# Patient Record
Sex: FEMALE | Race: WHITE | NOT HISPANIC OR LATINO | Employment: FULL TIME | URBAN - METROPOLITAN AREA
[De-identification: names, ages, dates, MRNs, and addresses within clinical notes are randomized per-mention and may not be internally consistent; named-entity substitution may affect disease eponyms.]

---

## 2017-01-02 ENCOUNTER — TRANSFERRED RECORDS (OUTPATIENT)
Dept: HEALTH INFORMATION MANAGEMENT | Facility: CLINIC | Age: 32
End: 2017-01-02

## 2017-03-06 DIAGNOSIS — E66.09 NON MORBID OBESITY DUE TO EXCESS CALORIES: ICD-10-CM

## 2017-03-06 RX ORDER — PHENTERMINE HYDROCHLORIDE 30 MG/1
30 CAPSULE ORAL EVERY MORNING
Qty: 30 CAPSULE | Refills: 0 | Status: SHIPPED | OUTPATIENT
Start: 2017-03-06 | End: 2017-04-04

## 2017-03-06 NOTE — TELEPHONE ENCOUNTER
Phentermine      Last Written Prescription Date:  1/5/16  Last Fill Quantity: 30,   # refills: 0  Last Office Visit with [facility], [facility] or  Health prescribing provider: 12/23/16  Future Office visit:       Routing refill request to provider for review/approval because:  Drug not on the [facility], [facility] or  Health refill protocol or controlled substance

## 2017-03-13 ENCOUNTER — OFFICE VISIT (OUTPATIENT)
Dept: INTERNAL MEDICINE | Facility: CLINIC | Age: 32
End: 2017-03-13
Payer: COMMERCIAL

## 2017-03-13 VITALS
WEIGHT: 138 LBS | DIASTOLIC BLOOD PRESSURE: 80 MMHG | TEMPERATURE: 97.8 F | SYSTOLIC BLOOD PRESSURE: 132 MMHG | BODY MASS INDEX: 25.86 KG/M2

## 2017-03-13 DIAGNOSIS — J01.90 ACUTE SINUSITIS WITH SYMPTOMS > 10 DAYS: Primary | ICD-10-CM

## 2017-03-13 PROCEDURE — 99213 OFFICE O/P EST LOW 20 MIN: CPT | Performed by: FAMILY MEDICINE

## 2017-03-13 RX ORDER — AMOXICILLIN AND CLAVULANATE POTASSIUM 500; 125 MG/1; MG/1
1 TABLET, FILM COATED ORAL 3 TIMES DAILY
Qty: 30 TABLET | Refills: 1 | Status: SHIPPED | OUTPATIENT
Start: 2017-03-13 | End: 2017-08-01

## 2017-03-13 NOTE — PROGRESS NOTES
SUBJECTIVE:                                                    Dinorah Baldwin is a 31 year old female who presents to clinic today for the following health issues:    SUBJECTIVE:  Dinorah Baldwin is a 31 year old female here with concerns about sinus infection.  She states onset of symptoms were 2 week(s) ago.  She has had maxillary pressure. Course of illness is same. Severity moderate  Current and Associated symptoms: nasal congestion and cough   Predisposing factors include HX of recurrent sinusitis. Recent treatment has included: Decongestants    Past Medical History   Diagnosis Date     ALLERGIC RHINITIS      Closed fracture of unspecified part of humerus 1997     Left     DEPRESSIVE DISORDER      Tobacco use disorder      Unspecified closed fracture of ankle 1993     Ankle fracture     Social History   Substance Use Topics     Smoking status: Former Smoker     Years: 1.00     Quit date: 10/22/2011     Smokeless tobacco: Never Used      Comment: 1 pack every 2.5 days     Alcohol use Yes      Comment: once every two weeks       ROS:  CONSTITUTIONAL:NEGATIVE for fever, chills, change in weight  INTEGUMENTARY/SKIN: NEGATIVE for worrisome rashes, moles or lesions    OBJECTIVE:  /80  Temp 97.8  F (36.6  C) (Oral)  Wt 138 lb (62.6 kg)  BMI 25.86 kg/m2  Exam:GENERAL APPEARANCE: healthy, alert and no distress  EYES: EOMI,  PERRL, conjunctiva clear  HENT: ear canals and TM's normal.  Nose and mouth without ulcers, erythema or lesions  NECK: supple, nontender, no lymphadenopathy  RESP: lungs clear to auscultation - no rales, rhonchi or wheezes  CV: regular rates and rhythm, normal S1 S2, no murmur noted  NEURO: Normal strength and tone, sensory exam grossly normal,  normal speech and mentation  SKIN: no suspicious lesions or rashes    ASSESSMENT:  Sinusitis    PLAN:  1. Acute sinusitis with symptoms > 10 days  Symptomatic cares were discussed in detail.   Pt instructed to come back to the clinic for  worsening sx    - amoxicillin-clavulanate (AUGMENTIN) 500-125 MG per tablet; Take 1 tablet by mouth 3 times daily  Dispense: 30 tablet; Refill: 1

## 2017-03-13 NOTE — NURSING NOTE
"Chief Complaint   Patient presents with     URI     Nasal congestion, runny nose, ears were ringing, facial pressure- sx started on Friday        Initial /80  Temp 97.8  F (36.6  C) (Oral)  Wt 138 lb (62.6 kg)  BMI 25.86 kg/m2 Estimated body mass index is 25.86 kg/(m^2) as calculated from the following:    Height as of 10/14/16: 5' 1.25\" (1.556 m).    Weight as of this encounter: 138 lb (62.6 kg).  Medication Reconciliation: complete      Pennie Mitchell CMA      "

## 2017-03-13 NOTE — MR AVS SNAPSHOT
"              After Visit Summary   3/13/2017    Dinorah Baldwin    MRN: [id number]           Patient Information     Date Of Birth          1985        Visit Information        Provider Department      3/13/2017 3:20 PM Mainor Ochoa MD [facility]        Today's Diagnoses     Acute sinusitis with symptoms > 10 days    -  1       Follow-ups after your visit        Your next 10 appointments already scheduled     Mar 13, 2017  3:20 PM CDT   SHORT with Mainor Ochoa MD   [facility] ([facility]    [address]  [facility] [address]   [phone]              Who to contact     If you have questions or need follow up information about today's clinic visit or your schedule please contact [facility] directly at [phone].  Normal or non-critical lab and imaging results will be communicated to you by MyChart, letter or phone within 4 business days after the clinic has received the results. If you do not hear from us within 7 days, please contact the clinic through MyChart or phone. If you have a critical or abnormal lab result, we will notify you by phone as soon as possible.  Submit refill requests through [employer] or call your pharmacy and they will forward the refill request to us. Please allow 3 business days for your refill to be completed.          Additional Information About Your Visit        MyChart Information     [employer] lets you send messages to your doctor, view your test results, renew your prescriptions, schedule appointments and more. To sign up, go to [email or web address] . Click on \"Log in\" on the left side of the screen, which will take you to the Welcome page. Then click on \"Sign up Now\" on the right side of the page.     You will be asked to enter the access code listed below, as well as some personal information. Please follow the directions to create your username and password.     Your access " code is: [email or web address]  Expires: 2017  9:30 AM     Your access code will  in 90 days. If you need help or a new code, please call your [facility] or [phone].        Care EveryWhere ID     This is your Care EveryWhere ID. This could be used by other organizations to access your [facility] medical records  [id number]        Your Vitals Were     Temperature BMI (Body Mass Index)                97.8  F (36.6  C) (Oral) 25.86 kg/m2           Blood Pressure from Last 3 Encounters:   17 132/80   16 117/67   16 122/86    Weight from Last 3 Encounters:   17 138 lb (62.6 kg)   16 143 lb 11.8 oz (65.2 kg)   16 144 lb (65.3 kg)              Today, you had the following     No orders found for display         Today's Medication Changes          These changes are accurate as of: 3/13/17  9:30 AM.  If you have any questions, ask your nurse or doctor.               Start taking these medicines.        Dose/Directions    amoxicillin-clavulanate 500-125 MG per tablet   Commonly known as:  AUGMENTIN   Used for:  Acute sinusitis with symptoms > 10 days   Started by:  Mainor Ochoa MD        Dose:  1 tablet   Take 1 tablet by mouth 3 times daily   Quantity:  30 tablet   Refills:  1            Where to get your medicines      These medications were sent to [facility] [place], [place] - [facility] [address]  [facility] [address] [facility] [address]     Phone:  [phone]     amoxicillin-clavulanate 500-125 MG per tablet                Primary Care Provider Office Phone # Fax #    Moraima Kurtz [phone]       [facility] [address]  [place] [facility] [address]        Thank you!     Thank you for choosing [facility]  for your care. Our goal is always to provide you with excellent care. Hearing back from our patients is one way we can continue to improve our services. Please take a few minutes to complete  the written survey that you may receive in the mail after your visit with us. Thank you!             Your Updated Medication List - Protect others around you: Learn how to safely use, store and throw away your medicines at www.disposemymeds.org.          This list is accurate as of: 3/13/17  9:30 AM.  Always use your most recent med list.                   Brand Name Dispense Instructions for use    amoxicillin-clavulanate 500-125 MG per tablet    AUGMENTIN    30 tablet    Take 1 tablet by mouth 3 times daily       LORazepam 0.5 MG tablet    ATIVAN     Take 0.5 mg by mouth as needed for anxiety       MULTI ADULT GUMMIES PO      Take 1 chew tab by mouth three times a week       NEXPLANON SC      by IMPLANT route continuous       oxyCODONE 5 MG IR tablet    ROXICODONE    60 tablet    Take 1-2 tablets (5-10 mg) by mouth every 4 hours as needed for pain maximum 10 tablet(s) per day       phentermine 30 MG capsule     30 capsule    Take 1 capsule (30 mg) by mouth every morning

## 2017-03-14 ASSESSMENT — PATIENT HEALTH QUESTIONNAIRE - PHQ9: SUM OF ALL RESPONSES TO PHQ QUESTIONS 1-9: 2

## 2017-04-04 DIAGNOSIS — E66.09 NON MORBID OBESITY DUE TO EXCESS CALORIES: ICD-10-CM

## 2017-04-04 RX ORDER — PHENTERMINE HYDROCHLORIDE 30 MG/1
30 CAPSULE ORAL EVERY MORNING
Qty: 30 CAPSULE | Refills: 1 | Status: SHIPPED | OUTPATIENT
Start: 2017-04-04 | End: 2017-07-17

## 2017-07-17 DIAGNOSIS — E66.09 NON MORBID OBESITY DUE TO EXCESS CALORIES: ICD-10-CM

## 2017-07-17 RX ORDER — PHENTERMINE HYDROCHLORIDE 30 MG/1
30 CAPSULE ORAL EVERY MORNING
Qty: 30 CAPSULE | Refills: 1 | Status: SHIPPED | OUTPATIENT
Start: 2017-07-17 | End: 2017-11-14

## 2017-08-01 ENCOUNTER — OFFICE VISIT (OUTPATIENT)
Dept: INTERNAL MEDICINE | Facility: CLINIC | Age: 32
End: 2017-08-01
Payer: COMMERCIAL

## 2017-08-01 VITALS
TEMPERATURE: 98.1 F | SYSTOLIC BLOOD PRESSURE: 120 MMHG | HEIGHT: 61 IN | HEART RATE: 117 BPM | OXYGEN SATURATION: 98 % | WEIGHT: 143 LBS | BODY MASS INDEX: 27 KG/M2 | DIASTOLIC BLOOD PRESSURE: 72 MMHG

## 2017-08-01 DIAGNOSIS — L91.0 KELOID SCAR: ICD-10-CM

## 2017-08-01 DIAGNOSIS — Z30.09 BIRTH CONTROL COUNSELING: ICD-10-CM

## 2017-08-01 DIAGNOSIS — N92.1 MENOMETRORRHAGIA: ICD-10-CM

## 2017-08-01 DIAGNOSIS — Z00.00 ENCOUNTER FOR ROUTINE ADULT HEALTH EXAMINATION WITHOUT ABNORMAL FINDINGS: Primary | ICD-10-CM

## 2017-08-01 LAB
ALBUMIN SERPL-MCNC: 3.8 G/DL (ref 3.4–5)
ALP SERPL-CCNC: 47 U/L (ref 40–150)
ALT SERPL W P-5'-P-CCNC: 50 U/L (ref 0–50)
ANION GAP SERPL CALCULATED.3IONS-SCNC: 9 MMOL/L (ref 3–14)
AST SERPL W P-5'-P-CCNC: 97 U/L (ref 0–45)
BASOPHILS # BLD AUTO: 0 10E9/L (ref 0–0.2)
BASOPHILS NFR BLD AUTO: 0.2 %
BILIRUB SERPL-MCNC: 0.4 MG/DL (ref 0.2–1.3)
BUN SERPL-MCNC: 10 MG/DL (ref 7–30)
CALCIUM SERPL-MCNC: 9.3 MG/DL (ref 8.5–10.1)
CHLORIDE SERPL-SCNC: 106 MMOL/L (ref 94–109)
CHOLEST SERPL-MCNC: 176 MG/DL
CO2 SERPL-SCNC: 25 MMOL/L (ref 20–32)
CREAT SERPL-MCNC: 0.78 MG/DL (ref 0.52–1.04)
DIFFERENTIAL METHOD BLD: ABNORMAL
EOSINOPHIL # BLD AUTO: 0.1 10E9/L (ref 0–0.7)
EOSINOPHIL NFR BLD AUTO: 0.8 %
ERYTHROCYTE [DISTWIDTH] IN BLOOD BY AUTOMATED COUNT: 12.9 % (ref 10–15)
GFR SERPL CREATININE-BSD FRML MDRD: 87 ML/MIN/1.7M2
GLUCOSE SERPL-MCNC: 93 MG/DL (ref 70–99)
HCT VFR BLD AUTO: 40.1 % (ref 35–47)
HDLC SERPL-MCNC: 61 MG/DL
HGB BLD-MCNC: 13.3 G/DL (ref 11.7–15.7)
LDLC SERPL CALC-MCNC: 95 MG/DL
LYMPHOCYTES # BLD AUTO: 2.6 10E9/L (ref 0.8–5.3)
LYMPHOCYTES NFR BLD AUTO: 19.3 %
MCH RBC QN AUTO: 30.2 PG (ref 26.5–33)
MCHC RBC AUTO-ENTMCNC: 33.2 G/DL (ref 31.5–36.5)
MCV RBC AUTO: 91 FL (ref 78–100)
MONOCYTES # BLD AUTO: 0.6 10E9/L (ref 0–1.3)
MONOCYTES NFR BLD AUTO: 4.3 %
NEUTROPHILS # BLD AUTO: 10.1 10E9/L (ref 1.6–8.3)
NEUTROPHILS NFR BLD AUTO: 75.4 %
NONHDLC SERPL-MCNC: 115 MG/DL
PLATELET # BLD AUTO: 243 10E9/L (ref 150–450)
POTASSIUM SERPL-SCNC: 4 MMOL/L (ref 3.4–5.3)
PROT SERPL-MCNC: 7.4 G/DL (ref 6.8–8.8)
RBC # BLD AUTO: 4.4 10E12/L (ref 3.8–5.2)
SODIUM SERPL-SCNC: 140 MMOL/L (ref 133–144)
TRIGL SERPL-MCNC: 98 MG/DL
TSH SERPL DL<=0.005 MIU/L-ACNC: 1.13 MU/L (ref 0.4–4)
WBC # BLD AUTO: 13.4 10E9/L (ref 4–11)

## 2017-08-01 PROCEDURE — 80061 LIPID PANEL: CPT | Performed by: INTERNAL MEDICINE

## 2017-08-01 PROCEDURE — 36415 COLL VENOUS BLD VENIPUNCTURE: CPT | Performed by: INTERNAL MEDICINE

## 2017-08-01 PROCEDURE — 99395 PREV VISIT EST AGE 18-39: CPT | Performed by: INTERNAL MEDICINE

## 2017-08-01 PROCEDURE — 80050 GENERAL HEALTH PANEL: CPT | Performed by: INTERNAL MEDICINE

## 2017-08-01 ASSESSMENT — ANXIETY QUESTIONNAIRES
3. WORRYING TOO MUCH ABOUT DIFFERENT THINGS: NOT AT ALL
5. BEING SO RESTLESS THAT IT IS HARD TO SIT STILL: NOT AT ALL
2. NOT BEING ABLE TO STOP OR CONTROL WORRYING: NOT AT ALL
6. BECOMING EASILY ANNOYED OR IRRITABLE: NOT AT ALL
GAD7 TOTAL SCORE: 0
1. FEELING NERVOUS, ANXIOUS, OR ON EDGE: NOT AT ALL
7. FEELING AFRAID AS IF SOMETHING AWFUL MIGHT HAPPEN: NOT AT ALL

## 2017-08-01 ASSESSMENT — PATIENT HEALTH QUESTIONNAIRE - PHQ9: 5. POOR APPETITE OR OVEREATING: NOT AT ALL

## 2017-08-01 NOTE — PROGRESS NOTES
SUBJECTIVE:   CC: Dinorah Baldwin is an 31 year old woman who presents for preventive health visit.     Physical   Annual:     Getting at least 3 servings of Calcium per day::  Yes    Bi-annual eye exam::  NO (AT [facility])    Dental care twice a year::  Yes    Sleep apnea or symptoms of sleep apnea::  None    Diet::  Regular (no restrictions)    Frequency of exercise::  4-5 days/week    Duration of exercise::  45-60 minutes    Taking medications regularly::  Yes    Medication side effects::  None    Additional concerns today::  No      [employer] with group exercises    Today's PHQ-2 Score: PHQ-2 ( 1999 Pfizer) 3/13/2017   Q1: Little interest or pleasure in doing things 0   Q2: Feeling down, depressed or hopeless 0   PHQ-2 Score 0       Abuse: Current or Past(Physical, Sexual or Emotional)- No  Do you feel safe in your environment - Yes    Social History   Substance Use Topics     Smoking status: Former Smoker     Years: 1.00     Quit date: 10/22/2011     Smokeless tobacco: Never Used      Comment: 1 pack every 2.5 days     Alcohol use Yes      Comment: once every two weeks     The patient does not drink >3 drinks per day nor >7 drinks per week.    Reviewed orders with patient.  Reviewed health maintenance and updated orders accordingly - Yes      Mammogram not appropriate for this patient based on age.    Pertinent mammograms are reviewed under the imaging tab.  History of abnormal Pap smear: NO - age 30- 65 PAP every 3 years recommended    Reviewed and updated as needed this visit by clinical staff  Allergies  Meds         Reviewed and updated as needed this visit by Provider      ROS:  C: NEGATIVE for fever, chills, change in weight  I: NEGATIVE for worrisome rashes, moles or lesions  E: NEGATIVE for vision changes or irritation  ENT: NEGATIVE for ear, mouth and throat problems  R: NEGATIVE for significant cough or SOB  B: NEGATIVE for masses, tenderness or discharge  CV: NEGATIVE for chest pain, palpitations or  "peripheral edema  GI: NEGATIVE for nausea, abdominal pain, heartburn, or change in bowel habits  : NEGATIVE for unusual urinary or vaginal symptoms. Periods are regular.  M: NEGATIVE for significant arthralgias or myalgia  N: NEGATIVE for weakness, dizziness or paresthesias  P: NEGATIVE for changes in mood or affect     OBJECTIVE:   There were no vitals taken for this visit.   /72 (BP Location: Left arm, Cuff Size: Adult Large)  Pulse 117  Temp 98.1  F (36.7  C) (Oral)  Ht 5' 1.25\" (1.556 m)  Wt 143 lb (64.9 kg)  LMP 07/29/2017  SpO2 98%  Breastfeeding? No  BMI 26.8 kg/m2    EXAM:  GENERAL: healthy, alert and no distress  EYES: Eyes grossly normal to inspection, PERRL and conjunctivae and sclerae normal  HENT: ear canals and TM's normal, nose and mouth without ulcers or lesions  NECK: no adenopathy, no asymmetry, masses, or scars and thyroid normal to palpation  RESP: lungs clear to auscultation - no rales, rhonchi or wheezes  BREAST: normal without masses, tenderness or nipple discharge and no palpable axillary masses or adenopathy  CV: regular rate and rhythm, normal S1 S2, no S3 or S4, no murmur, click or rub, no peripheral edema and peripheral pulses strong  ABDOMEN: soft, nontender, no hepatosplenomegaly, no masses and bowel sounds normal  MS: no gross musculoskeletal defects noted, no edema  SKIN: no suspicious lesions or rashes; keloid scarring on chest  NEURO: Normal strength and tone, mentation intact and speech normal  PSYCH: mentation appears normal, affect normal/bright    ASSESSMENT/PLAN:       ICD-10-CM    1. Encounter for routine adult health examination without abnormal findings Z00.00        COUNSELING:  Reviewed preventive health counseling, as reflected in patient instructions       reports that she quit smoking about 5 years ago. She quit after 1.00 years of use. She has never used smokeless tobacco.    Estimated body mass index is 25.86 kg/(m^2) as calculated from the " "following:    Height as of 10/14/16: 5' 1.25\" (1.556 m).    Weight as of 3/13/17: 138 lb (62.6 kg).         Counseling Resources:  ATP IV Guidelines  Pooled Cohorts Equation Calculator  Breast Cancer Risk Calculator  FRAX Risk Assessment  ICSI Preventive Guidelines  Dietary Guidelines for Americans, 2010  USDA's MyPlate  ASA Prophylaxis  Lung CA Screening    Moraima Kurtz MD  [facility]    Please abstract the following data from this visit with this patient into the appropriate field in Epic:    Pap smear done on this date: 3/1/2016 (approximately), by this group: [facility], results were wnl.     "

## 2017-08-01 NOTE — NURSING NOTE
"Chief Complaint   Patient presents with     Physical     fasting, discuss meds       Initial /72 (BP Location: Left arm, Cuff Size: Adult Large)  Pulse 117  Temp 98.1  F (36.7  C) (Oral)  Ht 5' 1.25\" (1.556 m)  Wt 143 lb (64.9 kg)  LMP 07/29/2017  SpO2 98%  Breastfeeding? No  BMI 26.8 kg/m2 Estimated body mass index is 26.8 kg/(m^2) as calculated from the following:    Height as of this encounter: 5' 1.25\" (1.556 m).    Weight as of this encounter: 143 lb (64.9 kg).  Medication Reconciliation: complete   Nena Champion CMA      "

## 2017-08-01 NOTE — MR AVS SNAPSHOT
After Visit Summary   8/1/2017    Dinorah Baldwin    MRN: [id number]           Patient Information     Date Of Birth          1985        Visit Information        Provider Department      8/1/2017 8:40 AM Moraima Kurtz MD [facility]        Today's Diagnoses     Encounter for routine adult health examination without abnormal findings    -  1    Birth control counseling           Follow-ups after your visit        Additional Services     OB/GYN REFERRAL       Your provider has referred you to:  [facility]: [facility] [phone]   [email or web address]      Please be aware that coverage of these services is subject to the terms and limitations of your health insurance plan.  Call member services at your health plan with any benefit or coverage questions.      Please bring the following with you to your appointment:    (1) Any X-Rays, CTs or MRIs which have been performed.  Contact the facility where they were done to arrange for  prior to your scheduled appointment.   (2) List of current medications   (3) This referral request   (4) Any documents/labs given to you for this referral                  Your next 10 appointments already scheduled     Aug 01, 2017  8:40 AM CDT   PHYSICAL with Moraima Kurtz MD   [facility] ([facility])    [address] [place], [place] [address] [address] [phone]              Who to contact     If you have questions or need follow up information about today's clinic visit or your schedule please contact [facility] directly at [phone].  Normal or non-critical lab and imaging results will be communicated to you by MyChart, letter or phone within 4 business days after the clinic has received the results. If you do not hear from us within 7 days, please contact the clinic through MyChart or phone. If you have a critical or abnormal  "lab result, we will notify you by phone as soon as possible.  Submit refill requests through [facility] or call your pharmacy and they will forward the refill request to us. Please allow 3 business days for your refill to be completed.          Additional Information About Your Visit        Linqu[facility] Information     [employer] lets you send messages to your doctor, view your test results, renew your prescriptions, schedule appointments and more. To sign up, go to [email or web address] . Click on \"Log in\" on the left side of the screen, which will take you to the Welcome page. Then click on \"Sign up Now\" on the right side of the page.     You will be asked to enter the access code listed below, as well as some personal information. Please follow the directions to create your username and password.     Your access code is: [email or web address]  Expires: 10/30/2017  8:28 AM     Your access code will  in 90 days. If you need help or a new code, please call your [facility] clinic or [phone].        Care EveryWhere ID     This is your Care EveryWhere ID. This could be used by other organizations to access your [facility] medical records  [id number]        Your Vitals Were     Pulse Temperature Height Last Period Pulse Oximetry Breastfeeding?    117 98.1  F (36.7  C) (Oral) 5' 1.25\" (1.556 m) 2017 98% No    BMI (Body Mass Index)                   26.8 kg/m2            Blood Pressure from Last 3 Encounters:   17 120/72   17 132/80   16 117/67    Weight from Last 3 Encounters:   17 143 lb (64.9 kg)   17 138 lb (62.6 kg)   16 143 lb 11.8 oz (65.2 kg)              We Performed the Following     CBC with platelets differential     Comprehensive metabolic panel     Lipid panel reflex to direct LDL     OB/GYN REFERRAL     TSH with free T4 reflex        Primary Care Provider Office Phone # Fax #    Moraima Kurtz [phone]       [facility] [address] " [facility] [address]        Equal Access to Services     IFEOMA HOFF : Hadii sony whyte peteropal Radha, wabryonda luqadaha, qaybta kahardeepsiva longo, ginette bernalmauryjessica motta. So [facility] [phone].    ATENCIÓN: Si habla español, tiene a cantrell disposición servicios gratuitos de asistencia lingüística. Llame al [phone].    We comply with applicable federal civil rights laws and [place] laws. We do not discriminate on the basis of race, color, national origin, age, disability sex, sexual orientation or gender identity.            Thank you!     Thank you for choosing [facility]  for your care. Our goal is always to provide you with excellent care. Hearing back from our patients is one way we can continue to improve our services. Please take a few minutes to complete the written survey that you may receive in the mail after your visit with us. Thank you!             Your Updated Medication List - Protect others around you: Learn how to safely use, store and throw away your medicines at www.disposemymeds.org.          This list is accurate as of: 8/1/17  8:33 AM.  Always use your most recent med list.                   Brand Name Dispense Instructions for use Diagnosis    LORazepam 0.5 MG tablet    ATIVAN     Take 0.5 mg by mouth as needed for anxiety        MULTI ADULT GUMMIES PO      Take 1 chew tab by mouth three times a week        NEXPLANON SC      by IMPLANT route continuous        phentermine 30 MG capsule     30 capsule    Take 1 capsule (30 mg) by mouth every morning    Non morbid obesity due to excess calories

## 2017-08-01 NOTE — TELEPHONE ENCOUNTER
Pt requesting refill of previous OCP. Pt currently has the Nexplanon but is requesting OCP to manage periods as they are lasting about 2 weeks.

## 2017-08-01 NOTE — LETTER
[facility]  [address], [address]  [place] [address]  [phone]        August 3, 2017    Dinorah Baldwin  [address] [facility] [address]            Dear Ms. Dinorah Baldwin:      Enclosed are the results of the recent labs.     The labs are all within normal limits except the liver test is slightly elevated and the white blood cell count.   Recommend repeating these labs in 2 weeks at a LAB ONLY appointment.     Please call the clinic at [phone] if you have any questions.      Sincerely,        Moraima Kurtz M.D.

## 2017-08-02 ASSESSMENT — ANXIETY QUESTIONNAIRES: GAD7 TOTAL SCORE: 0

## 2017-08-09 ENCOUNTER — TELEPHONE (OUTPATIENT)
Dept: INTERNAL MEDICINE | Facility: CLINIC | Age: 32
End: 2017-08-09

## 2017-08-09 DIAGNOSIS — J01.01 ACUTE RECURRENT MAXILLARY SINUSITIS: Primary | ICD-10-CM

## 2017-08-09 RX ORDER — AZITHROMYCIN 250 MG/1
TABLET, FILM COATED ORAL
Qty: 6 TABLET | Refills: 0 | Status: SHIPPED | OUTPATIENT
Start: 2017-08-09 | End: 2017-12-04

## 2017-08-09 NOTE — TELEPHONE ENCOUNTER
Reason for Call:  Medication or medication refill:    Do you use a [facility]?  Name of the pharmacy and phone number for the current request:  [facility] [address] ([place]) - [phone]    Name of the medication requested: amoxicillen    Other request: none    Can we leave a detailed message on this number? YES    Phone number patient can be reached at: Cell number on file:    Telephone Information:   Mobile [phone]       Best Time: any     Call taken on 8/9/2017 at 9:15 AM by Michela Spivey

## 2017-08-09 NOTE — TELEPHONE ENCOUNTER
Called pt-Stated she is sure has a sinus infection. Symptoms started last week-Facial pressure, green nasal d/c. Pt hoping [facility] can do rx for Amox

## 2017-08-15 ENCOUNTER — TELEPHONE (OUTPATIENT)
Dept: INTERNAL MEDICINE | Facility: CLINIC | Age: 32
End: 2017-08-15

## 2017-08-15 DIAGNOSIS — J01.00 ACUTE MAXILLARY SINUSITIS, RECURRENCE NOT SPECIFIED: Primary | ICD-10-CM

## 2017-08-15 DIAGNOSIS — R74.01 ELEVATED AST (SGOT): ICD-10-CM

## 2017-08-15 DIAGNOSIS — D72.828 OTHER ELEVATED WHITE BLOOD CELL (WBC) COUNT: ICD-10-CM

## 2017-08-15 LAB
AST SERPL W P-5'-P-CCNC: 13 U/L (ref 0–45)
BASOPHILS # BLD AUTO: 0 10E9/L (ref 0–0.2)
BASOPHILS NFR BLD AUTO: 0.2 %
DIFFERENTIAL METHOD BLD: ABNORMAL
EOSINOPHIL # BLD AUTO: 0.1 10E9/L (ref 0–0.7)
EOSINOPHIL NFR BLD AUTO: 0.5 %
ERYTHROCYTE [DISTWIDTH] IN BLOOD BY AUTOMATED COUNT: 13.1 % (ref 10–15)
HCT VFR BLD AUTO: 40 % (ref 35–47)
HGB BLD-MCNC: 13.2 G/DL (ref 11.7–15.7)
LYMPHOCYTES # BLD AUTO: 3.1 10E9/L (ref 0.8–5.3)
LYMPHOCYTES NFR BLD AUTO: 27.1 %
MCH RBC QN AUTO: 29.7 PG (ref 26.5–33)
MCHC RBC AUTO-ENTMCNC: 33 G/DL (ref 31.5–36.5)
MCV RBC AUTO: 90 FL (ref 78–100)
MONOCYTES # BLD AUTO: 0.7 10E9/L (ref 0–1.3)
MONOCYTES NFR BLD AUTO: 6.4 %
NEUTROPHILS # BLD AUTO: 7.5 10E9/L (ref 1.6–8.3)
NEUTROPHILS NFR BLD AUTO: 65.8 %
PLATELET # BLD AUTO: 267 10E9/L (ref 150–450)
RBC # BLD AUTO: 4.44 10E12/L (ref 3.8–5.2)
WBC # BLD AUTO: 11.4 10E9/L (ref 4–11)

## 2017-08-15 PROCEDURE — 36415 COLL VENOUS BLD VENIPUNCTURE: CPT | Performed by: INTERNAL MEDICINE

## 2017-08-15 PROCEDURE — 84450 TRANSFERASE (AST) (SGOT): CPT | Performed by: INTERNAL MEDICINE

## 2017-08-15 PROCEDURE — 85025 COMPLETE CBC W/AUTO DIFF WBC: CPT | Performed by: INTERNAL MEDICINE

## 2017-08-15 NOTE — TELEPHONE ENCOUNTER
Could give the medication 5 more days (zpak lasts in system for 10 days) or could try augmentin if symptoms keep worsening    I sent augmentin to pharmacy in case still no improvement in a couple of days    Patient will need to let pharmacy know to fill script if she decides to take med

## 2017-08-15 NOTE — TELEPHONE ENCOUNTER
Patient finished her RX for Zithromax yesterday 8/14. Patient states symptoms are still ongoing and would like to know what PCP recommends next for treatment.     Symptoms still include: Green Mucus, Congestion, Pressure on left side of face.

## 2017-08-15 NOTE — TELEPHONE ENCOUNTER
Pt advised to allow medication 5 more days to work, but if symptoms get worse over the next few days she can take Augmentin. Prescription was sent for Augmentin and pt to let the pharmacy know if she needs to fill this. Pt verbalizes understanding and agrees with plan.

## 2017-11-14 ENCOUNTER — TELEPHONE (OUTPATIENT)
Dept: OBGYN | Facility: CLINIC | Age: 32
End: 2017-11-14

## 2017-11-14 ENCOUNTER — TELEPHONE (OUTPATIENT)
Dept: INTERNAL MEDICINE | Facility: CLINIC | Age: 32
End: 2017-11-14

## 2017-11-14 DIAGNOSIS — E66.09 NON MORBID OBESITY DUE TO EXCESS CALORIES: ICD-10-CM

## 2017-11-14 DIAGNOSIS — Z11.3 SCREEN FOR STD (SEXUALLY TRANSMITTED DISEASE): ICD-10-CM

## 2017-11-14 DIAGNOSIS — Z11.3 SCREEN FOR STD (SEXUALLY TRANSMITTED DISEASE): Primary | ICD-10-CM

## 2017-11-14 PROCEDURE — 36415 COLL VENOUS BLD VENIPUNCTURE: CPT | Performed by: FAMILY MEDICINE

## 2017-11-14 PROCEDURE — 87389 HIV-1 AG W/HIV-1&-2 AB AG IA: CPT | Performed by: FAMILY MEDICINE

## 2017-11-14 RX ORDER — PHENTERMINE HYDROCHLORIDE 30 MG/1
30 CAPSULE ORAL EVERY MORNING
Qty: 30 CAPSULE | Refills: 1 | Status: SHIPPED | OUTPATIENT
Start: 2017-11-14 | End: 2018-05-14

## 2017-11-14 NOTE — TELEPHONE ENCOUNTER
Rx hand carried to [facility].  Attempted to contact patient, no answer, left detailed voice message as instructed by patient with provider message from below and informed to call back with questions.

## 2017-11-14 NOTE — TELEPHONE ENCOUNTER
Reason for Call:  Medication or medication refill:    Do you use a [facility]?  Name of the pharmacy and phone number for the current request:  [facility] [address] ([place]) - [phone]    Name of the medication requested: Phentermine     Other request: Pt states weight gain has occurred, almost back up to 150 lbs. Wondering if she can restart the rx (phentermine) for a few months, has made upcoming appointment to also discuss weight fluctuations/fatigue which she believes could be cause.    Can we leave a detailed message on this number? YES    Phone number patient can be reached at: Home number on file [phone] (home)    Best Time: any    Call taken on 11/14/2017 at 9:01 AM by Lina Wilkins

## 2017-11-14 NOTE — TELEPHONE ENCOUNTER
Pt requesting HIV testing.   Order placed.   Pt will have a lab only appt.   I also told the pt that she should have a f/u test in  And 6 months.    CAROLEE Lynch RN

## 2017-11-15 LAB — HIV 1+2 AB+HIV1 P24 AG SERPL QL IA: NONREACTIVE

## 2017-12-04 ENCOUNTER — OFFICE VISIT (OUTPATIENT)
Dept: INTERNAL MEDICINE | Facility: CLINIC | Age: 32
End: 2017-12-04
Payer: COMMERCIAL

## 2017-12-04 VITALS
DIASTOLIC BLOOD PRESSURE: 80 MMHG | SYSTOLIC BLOOD PRESSURE: 120 MMHG | BODY MASS INDEX: 27.94 KG/M2 | HEART RATE: 110 BPM | OXYGEN SATURATION: 99 % | TEMPERATURE: 97.9 F | HEIGHT: 61 IN | WEIGHT: 148 LBS

## 2017-12-04 DIAGNOSIS — R53.83 FATIGUE, UNSPECIFIED TYPE: ICD-10-CM

## 2017-12-04 DIAGNOSIS — F32.1 MODERATE SINGLE CURRENT EPISODE OF MAJOR DEPRESSIVE DISORDER (H): Primary | ICD-10-CM

## 2017-12-04 DIAGNOSIS — F41.1 GAD (GENERALIZED ANXIETY DISORDER): ICD-10-CM

## 2017-12-04 PROCEDURE — 99213 OFFICE O/P EST LOW 20 MIN: CPT | Performed by: INTERNAL MEDICINE

## 2017-12-04 RX ORDER — BUPROPION HYDROCHLORIDE 150 MG/1
TABLET, EXTENDED RELEASE ORAL
Qty: 60 TABLET | Refills: 2 | Status: SHIPPED | OUTPATIENT
Start: 2017-12-04 | End: 2018-03-14

## 2017-12-04 ASSESSMENT — ANXIETY QUESTIONNAIRES
5. BEING SO RESTLESS THAT IT IS HARD TO SIT STILL: NOT AT ALL
GAD7 TOTAL SCORE: 10
IF YOU CHECKED OFF ANY PROBLEMS ON THIS QUESTIONNAIRE, HOW DIFFICULT HAVE THESE PROBLEMS MADE IT FOR YOU TO DO YOUR WORK, TAKE CARE OF THINGS AT HOME, OR GET ALONG WITH OTHER PEOPLE: VERY DIFFICULT
6. BECOMING EASILY ANNOYED OR IRRITABLE: SEVERAL DAYS
1. FEELING NERVOUS, ANXIOUS, OR ON EDGE: MORE THAN HALF THE DAYS
2. NOT BEING ABLE TO STOP OR CONTROL WORRYING: MORE THAN HALF THE DAYS
3. WORRYING TOO MUCH ABOUT DIFFERENT THINGS: NEARLY EVERY DAY
7. FEELING AFRAID AS IF SOMETHING AWFUL MIGHT HAPPEN: NOT AT ALL

## 2017-12-04 ASSESSMENT — PATIENT HEALTH QUESTIONNAIRE - PHQ9
SUM OF ALL RESPONSES TO PHQ QUESTIONS 1-9: 10
5. POOR APPETITE OR OVEREATING: MORE THAN HALF THE DAYS

## 2017-12-04 NOTE — NURSING NOTE
"Chief Complaint   Patient presents with     Weight Problem     back on phentermine, fatigue and stress       Initial /80 (BP Location: Left arm, Cuff Size: Adult Large)  Pulse 120  Temp 97.9  F (36.6  C) (Oral)  Ht 5' 1.25\" (1.556 m)  Wt 148 lb (67.1 kg)  SpO2 99%  Breastfeeding? No  BMI 27.74 kg/m2 Estimated body mass index is 27.74 kg/(m^2) as calculated from the following:    Height as of this encounter: 5' 1.25\" (1.556 m).    Weight as of this encounter: 148 lb (67.1 kg).  Medication Reconciliation: complete   Nena Champion CMA      "

## 2017-12-04 NOTE — MR AVS SNAPSHOT
"              After Visit Summary   12/4/2017    Dinorah Baldwin    MRN: [id number]           Patient Information     Date Of Birth          1985        Visit Information        Provider Department      12/4/2017 1:00 PM Moraima Kurtz MD [facility]        Today's Diagnoses     Moderate single current episode of major depressive disorder (H)    -  1    MAYO (generalized anxiety disorder)        Fatigue, unspecified type          Care Instructions    Melatonin for sleeping 1mg 4 hours before bed          Follow-ups after your visit        Who to contact     If you have questions or need follow up information about today's clinic visit or your schedule please [facility] at [phone].  Normal or non-critical lab and imaging results will be communicated to you by MyChart, letter or phone within 4 business days after the clinic has received the results. If you do not hear from us within 7 days, please contact the clinic through [employer] or phone. If you have a critical or abnormal lab result, we will notify you by phone as soon as possible.  Submit refill requests through [employer] or call your pharmacy and they will forward the refill request to us. Please allow 3 business days for your refill to be completed.          Additional Information About Your Visit        MyChart Information     [employer] lets you send messages to your doctor, view your test results, renew your prescriptions, schedule appointments and more. To sign up, go to [email or web address] . Click on \"Log in\" on the left side of the screen, which will take you to the Welcome page. Then click on \"Sign up Now\" on the right side of the page.     You will be asked to enter the access code listed below, as well as some personal information. Please follow the directions to create your username and password.     Your access code is: [email or web address]  Expires: 3/4/2018  2:17 PM     Your access code " "will  in 90 days. If you need help or a new code, please call your [place] clinic or [phone].        Care EveryWhere ID     This is your Care EveryWhere ID. This could be used by other organizations to access your [facility] [place] medical records  [id number]        Your Vitals Were     Pulse Temperature Height Pulse Oximetry Breastfeeding? BMI (Body Mass Index)    110 97.9  F (36.6  C) (Oral) 5' 1.25\" (1.556 m) 99% No 27.74 kg/m2       Blood Pressure from Last 3 Encounters:   17 120/80   17 120/72   17 132/80    Weight from Last 3 Encounters:   17 148 lb (67.1 kg)   17 143 lb (64.9 kg)   17 138 lb (62.6 kg)              We Performed the Following     CBC with platelets differential     TSH with free T4 reflex     Vitamin D Deficiency          Today's Medication Changes          These changes are accurate as of: 17  2:17 PM.  If you have any questions, ask your nurse or doctor.               Start taking these medicines.        Dose/Directions    buPROPion 150 MG 12 hr tablet   Commonly known as:  WELLBUTRIN SR   Used for:  Moderate single current episode of major depressive disorder (H)   Started by:  Moraima Kurtz MD        Take 1 tablet once daily and increase to 1 tablet twice daily after 4 to 7 days   Quantity:  60 tablet   Refills:  2            Where to get your medicines      These medications were sent to [facility] [place], [place] - [address]  [facility] [address] [facility] [address]     Phone:  [phone]     buPROPion 150 MG 12 hr tablet                Primary Care Provider Office Phone # Fax #    Moraima Kurtz [phone]       [address]  [facility] [address]        Equal Access to Services     [facility] LUIS EDUARDO AH: Cate greeno Somikala, waaxda luqadaha, qaybta kaalmada adeegyada, waxay tha motta. So [facility] [phone].    ATENCIÓN: Si zaina [place]" cantrell disposición servicios gratuitos de asistencia lingüística. Harry rodrigues [phone].    We comply with applicable federal civil rights laws and [place] laws. We do not discriminate on the basis of race, color, national origin, age, disability, sex, sexual orientation, or gender identity.            Thank you!     Thank you for choosing [facility]  for your care. Our goal is always to provide you with excellent care. Hearing back from our patients is one way we can continue to improve our services. Please take a few minutes to complete the written survey that you may receive in the mail after your visit with us. Thank you!             Your Updated Medication List - Protect others around you: Learn how to safely use, store and throw away your medicines at www.disposemymeds.org.          This list is accurate as of: 12/4/17  2:17 PM.  Always use your most recent med list.                   Brand Name Dispense Instructions for use Diagnosis    buPROPion 150 MG 12 hr tablet    WELLBUTRIN SR    60 tablet    Take 1 tablet once daily and increase to 1 tablet twice daily after 4 to 7 days    Moderate single current episode of major depressive disorder (H)       LORazepam 0.5 MG tablet    ATIVAN     Take 0.5 mg by mouth as needed for anxiety        MULTI ADULT GUMMIES PO      Take 1 chew tab by mouth three times a week        NEXPLANON SC      by IMPLANT route continuous        norethindrone-ethinyl estradiol 1-35 MG-MCG per tablet    ORTHO-NOVUM 1-35 TAB,NORTREL 1-35 TAB    90 tablet    Take 1 tablet by mouth daily    Menometrorrhagia       phentermine 30 MG capsule     30 capsule    Take 1 capsule (30 mg) by mouth every morning    Non morbid obesity due to excess calories

## 2017-12-04 NOTE — PROGRESS NOTES
"  SUBJECTIVE:   Dinorah Baldwin is a 32 year old female who presents to clinic today for the following health issues:      Depression Followup    Status since last visit: Stable     See PHQ-9 for current symptoms.  Other associated symptoms: None    Complicating factors:   Significant life event:  No   Current substance abuse:  None  Anxiety or Panic symptoms:  No    PHQ-9 Score and MyChart F/U Questions 5/9/2016 3/13/2017   Total Score 2 2   Q9: Suicide Ideation Not at all Not at all     .  PHQ-9  English  PHQ-9   Any Language  Suicide Assessment Five-step Evaluation and Treatment (SAFE-T)      Amount of exercise or physical activity: 3-4 days per week- [employer] classes    Problems taking medications regularly: No    Medication side effects: none    Diet: regular (no restrictions)    Insomnia. She is not sleeping very well.  She has mice in her house currently. Daughter recently needed stictches  No lights before bed. Caffeine only in the morning.   She has cut out soda.     Problem list and histories reviewed & adjusted, as indicated.  Additional history: as documented    Labs reviewed in EPIC    [facility] and [facility]       Reviewed and updated as needed this visit by Provider         ROS:  C: NEGATIVE for fever, chills, change in weight  R: NEGATIVE for significant cough or SOB  CV: NEGATIVE for chest pain, palpitations or peripheral edema    OBJECTIVE:     /80 (BP Location: Left arm, Cuff Size: Adult Large)  Pulse 110  Temp 97.9  F (36.6  C) (Oral)  Ht 5' 1.25\" (1.556 m)  Wt 148 lb (67.1 kg)  SpO2 99%  Breastfeeding? No  BMI 27.74 kg/m2  Body mass index is 27.74 kg/(m^2).  GENERAL: healthy, alert and no distress  RESP: lungs clear to auscultation - no rales, rhonchi or wheezes  CV: regular rate and rhythm, normal S1 S2, no S3 or S4, no murmur, click or rub, no peripheral edema and peripheral pulses strong  Psych: normal affect    ASSESSMENT/PLAN: "     (F32.1) Moderate single current episode of major depressive disorder (H)  (primary encounter diagnosis)  Comment:   Plan: buPROPion (WELLBUTRIN SR) 150 MG 12 hr tablet,         Vitamin D Deficiency, TSH with free T4 reflex        Pt to let us know if no improvement    (F41.1) MAYO (generalized anxiety disorder)  Comment:   Plan:     (R53.83) Fatigue, unspecified type  Comment: likely multifactorial, including decreased amount of sleep  Plan: Vitamin D Deficiency, TSH with free T4 reflex,         CBC with platelets differential        -trial of melatonin        Moraima Kurtz MD  [facility]

## 2017-12-05 ASSESSMENT — ANXIETY QUESTIONNAIRES: GAD7 TOTAL SCORE: 10

## 2018-01-30 DIAGNOSIS — Z11.3 SCREEN FOR STD (SEXUALLY TRANSMITTED DISEASE): Primary | ICD-10-CM

## 2018-02-07 DIAGNOSIS — F32.1 MODERATE SINGLE CURRENT EPISODE OF MAJOR DEPRESSIVE DISORDER (H): ICD-10-CM

## 2018-02-07 DIAGNOSIS — Z11.3 SCREEN FOR STD (SEXUALLY TRANSMITTED DISEASE): ICD-10-CM

## 2018-02-07 DIAGNOSIS — R53.83 FATIGUE, UNSPECIFIED TYPE: ICD-10-CM

## 2018-02-07 LAB
BASOPHILS # BLD AUTO: 0 10E9/L (ref 0–0.2)
BASOPHILS NFR BLD AUTO: 0.1 %
DIFFERENTIAL METHOD BLD: NORMAL
EOSINOPHIL # BLD AUTO: 0.1 10E9/L (ref 0–0.7)
EOSINOPHIL NFR BLD AUTO: 0.5 %
ERYTHROCYTE [DISTWIDTH] IN BLOOD BY AUTOMATED COUNT: 12.9 % (ref 10–15)
HCT VFR BLD AUTO: 41.3 % (ref 35–47)
HGB BLD-MCNC: 14 G/DL (ref 11.7–15.7)
LYMPHOCYTES # BLD AUTO: 3.6 10E9/L (ref 0.8–5.3)
LYMPHOCYTES NFR BLD AUTO: 34.8 %
MCH RBC QN AUTO: 30 PG (ref 26.5–33)
MCHC RBC AUTO-ENTMCNC: 33.9 G/DL (ref 31.5–36.5)
MCV RBC AUTO: 88 FL (ref 78–100)
MONOCYTES # BLD AUTO: 0.5 10E9/L (ref 0–1.3)
MONOCYTES NFR BLD AUTO: 4.7 %
NEUTROPHILS # BLD AUTO: 6.2 10E9/L (ref 1.6–8.3)
NEUTROPHILS NFR BLD AUTO: 59.9 %
PLATELET # BLD AUTO: 275 10E9/L (ref 150–450)
RBC # BLD AUTO: 4.67 10E12/L (ref 3.8–5.2)
WBC # BLD AUTO: 10.3 10E9/L (ref 4–11)

## 2018-02-07 PROCEDURE — 85025 COMPLETE CBC W/AUTO DIFF WBC: CPT | Performed by: INTERNAL MEDICINE

## 2018-02-07 PROCEDURE — 36415 COLL VENOUS BLD VENIPUNCTURE: CPT | Performed by: INTERNAL MEDICINE

## 2018-02-07 PROCEDURE — 87389 HIV-1 AG W/HIV-1&-2 AB AG IA: CPT | Performed by: INTERNAL MEDICINE

## 2018-02-07 PROCEDURE — 84443 ASSAY THYROID STIM HORMONE: CPT | Performed by: INTERNAL MEDICINE

## 2018-02-07 PROCEDURE — 82306 VITAMIN D 25 HYDROXY: CPT | Performed by: INTERNAL MEDICINE

## 2018-02-08 LAB
DEPRECATED CALCIDIOL+CALCIFEROL SERPL-MC: 17 UG/L (ref 20–75)
HIV 1+2 AB+HIV1 P24 AG SERPL QL IA: NONREACTIVE
TSH SERPL DL<=0.005 MIU/L-ACNC: 1.07 MU/L (ref 0.4–4)

## 2018-03-03 ENCOUNTER — OFFICE VISIT (OUTPATIENT)
Dept: URGENT CARE | Facility: URGENT CARE | Age: 33
End: 2018-03-03
Payer: COMMERCIAL

## 2018-03-03 DIAGNOSIS — Z20.818 STREP THROAT EXPOSURE: ICD-10-CM

## 2018-03-03 DIAGNOSIS — R07.0 THROAT PAIN: Primary | ICD-10-CM

## 2018-03-03 LAB
DEPRECATED S PYO AG THROAT QL EIA: NORMAL
SPECIMEN SOURCE: NORMAL

## 2018-03-03 PROCEDURE — 87880 STREP A ASSAY W/OPTIC: CPT | Performed by: FAMILY MEDICINE

## 2018-03-03 PROCEDURE — 99213 OFFICE O/P EST LOW 20 MIN: CPT | Performed by: FAMILY MEDICINE

## 2018-03-03 PROCEDURE — 87081 CULTURE SCREEN ONLY: CPT | Performed by: FAMILY MEDICINE

## 2018-03-03 NOTE — MR AVS SNAPSHOT
After Visit Summary   3/3/2018    Dinorah Baldwin    MRN: [id number]           Patient Information     Date Of Birth          1985        Visit Information        Provider Department      3/3/2018 12:35 PM Kelsie Mosquera DO [facility]        Today's Diagnoses     Throat pain    -  1    Strep throat exposure          Care Instructions      Self-Care for Sore Throats    Sore throats happen for many reasons, such as colds, allergies, and infections caused by viruses or bacteria. In any case, your throat becomes red and sore. Your goal for self-care is to reduce your discomfort while giving your throat a chance to heal.  Moisten and soothe your throat  Tips include the following:    Try a sip of water first thing after waking up.    Keep your throat moist by drinking 6 or more glasses of clear liquids every day.    Run a cool-air humidifier in your room overnight.    Avoid cigarette smoke.     Suck on throat lozenges, cough drops, hard candy, ice chips, or frozen fruit-juice bars. Use the sugar-free versions if your diet or medical condition requires them.  Gargle to ease irritation  Gargling every hour or 2 can ease irritation. Try gargling with 1 of these solutions:    1/4 teaspoon of salt in 1/2 cup of warm water    An over-the-counter anesthetic gargle  Use medicine for more relief  Over-the-counter medicine can reduce sore throat symptoms. Ask your pharmacist if you have questions about which medicine to use:    Ease pain with anesthetic sprays. Aspirin or an aspirin substitute also helps. Remember, never give aspirin to anyone 18 or younger, or if you are already taking blood thinners.     For sore throats caused by allergies, try antihistamines to block the allergic reaction.    Remember: unless a sore throat is caused by a bacterial infection, antibiotics won t help you.  Prevent future sore throats  Prevention tips include the following:    Stop smoking or reduce  contact with secondhand smoke. Smoke irritates the tender throat lining.    Limit contact with pets and with allergy-causing substances, such as pollen and mold.    When you re around someone with a sore throat or cold, wash your hands often to keep viruses or bacteria from spreading.    Don t strain your vocal cords.  Call your healthcare provider  Contact your healthcare provider if you have:    A temperature over 101 F (38.3 C)    White spots on the throat    Great difficulty swallowing    Trouble breathing    A skin rash    Recent exposure to someone else with strep bacteria    Severe hoarseness and swollen glands in the neck or jaw   Date Last Reviewed: 8/1/2016 2000-2017 [employer]. [address] [place] [address]. All rights reserved. This information is not intended as a substitute for professional medical care. Always follow your healthcare professional's instructions.                Follow-ups after your visit        Who to contact     If you have questions or need follow up information about today's clinic visit or your schedule please contact [facility] directly at [phone].  Normal or non-critical lab and imaging results will be communicated to you by MyChart, letter or phone within 4 business days after the clinic has received the results. If you do not hear from us within 7 days, please contact the clinic through [employer] or phone. If you have a critical or abnormal lab result, we will notify you by phone as soon as possible.  Submit refill requests through [employer] or call your pharmacy and they will forward the refill request to us. Please allow 3 business days for your refill to be completed.          Additional Information About Your Visit        MyChart Information     [employer] lets you send messages to your doctor, view your test results, renew your prescriptions, schedule appointments and more. To sign up, go to [email or web address] . Click  "on \"Log in\" on the left side of the screen, which will take you to the Welcome page. Then click on \"Sign up Now\" on the right side of the page.     You will be asked to enter the access code listed below, as well as some personal information. Please follow the directions to create your username and password.     Your access code is: [email or web address]  Expires: 3/4/2018  2:17 PM     Your access code will  in 90 days. If you need help or a new code, please call your [place] clinic or [phone].        Care EveryWhere ID     This is your Care EveryWhere ID. This could be used by other organizations to access your [place] medical records  [id number]         Blood Pressure from Last 3 Encounters:   17 120/80   17 120/72   17 132/80    Weight from Last 3 Encounters:   17 148 lb (67.1 kg)   17 143 lb (64.9 kg)   17 138 lb (62.6 kg)              We Performed the Following     Beta strep group A culture     Strep, Rapid Screen        Primary Care Provider Office Phone # Fax #    Moraima Clark Kurtz [phone]       [address] [facility] [address]        Equal Access to Services     [facility] : Hadii aad ku hadasho Soomaali, waaxda luqadaha, qaybta kaalmada adeegyada, ginette almazan hayavn mateus rueda . So [facility] [phone].    ATENCIÓN: Si habla español, tiene a cantrell disposición servicios gratuitos de asistencia lingüística. Llame al [phone].    We comply with applicable federal civil rights laws and [place] laws. We do not discriminate on the basis of race, color, national origin, age, disability, sex, sexual orientation, or gender identity.            Thank you!     Thank you for choosing [facility]  for your care. Our goal is always to provide you with excellent care. Hearing back from our patients is one way we can continue to improve our services. Please take a few minutes to complete the written survey that you may " receive in the mail after your visit with us. Thank you!             Your Updated Medication List - Protect others around you: Learn how to safely use, store and throw away your medicines at www.disposemymeds.org.          This list is accurate as of 3/3/18 12:56 PM.  Always use your most recent med list.                   Brand Name Dispense Instructions for use Diagnosis    buPROPion 150 MG 12 hr tablet    WELLBUTRIN SR    60 tablet    Take 1 tablet once daily and increase to 1 tablet twice daily after 4 to 7 days    Moderate single current episode of major depressive disorder (H)       LORazepam 0.5 MG tablet    ATIVAN     Take 0.5 mg by mouth as needed for anxiety        MULTI ADULT GUMMIES PO      Take 1 chew tab by mouth three times a week        NEXPLANON SC      by IMPLANT route continuous        norethindrone-ethinyl estradiol 1-35 MG-MCG per tablet    ORTHO-NOVUM 1-35 TAB,NORTREL 1-35 TAB    90 tablet    Take 1 tablet by mouth daily    Menometrorrhagia       phentermine 30 MG capsule     30 capsule    Take 1 capsule (30 mg) by mouth every morning    Non morbid obesity due to excess calories

## 2018-03-03 NOTE — PROGRESS NOTES
SUBJECTIVE:   Dinorah Baldwin is a 32 year old female presenting with a chief complaint of sore throat and strep exposure.  Daughter diagnosed with strep today and cousins in house a lot this week were also positive for strep.  No uri symptoms. No fevers.   Just returned from trip to [place] yesterday.      ROS:  5-Point Review of Systems Negative-- Except as stated above.    OBJECTIVE  There were no vitals taken for this visit.  GENERAL:  Awake, alert and interactive. No acute distress.  HEENT:   NC/AT, EOMI, clear conjunctiva.  Nose clear.  Oropharynx with mild erythema, no exudate, moist and clear.  TM's and EAC's benign.  NECK: supple and free of adenopathy  CHEST:  Lungs are clear, no rhonchi, wheezing or rales. Normal symmetric air entry throughout both lung fields.   HEART:  S1 and S2 normal, no murmurs, clicks, gallops or rubs. Regular rate and rhythm.    Results for orders placed or performed in visit on 03/03/18   Strep, Rapid Screen   Result Value Ref Range    Specimen Description Throat     Rapid Strep A Screen       NEGATIVE: No Group A streptococcal antigen detected by immunoassay, await culture report.       ASSESSMENT/PLAN    ICD-10-CM    1. Throat pain R07.0    2. Strep throat exposure Z20.818 Strep, Rapid Screen     Beta strep group A culture     Strep culture pending.   We discussed the expected course and symptomatic cares in detail, including return to care if symptoms not improving as expected, do not resolve completely, or if any new or worsening symptoms develop.

## 2018-03-03 NOTE — PATIENT INSTRUCTIONS
Self-Care for Sore Throats    Sore throats happen for many reasons, such as colds, allergies, and infections caused by viruses or bacteria. In any case, your throat becomes red and sore. Your goal for self-care is to reduce your discomfort while giving your throat a chance to heal.  Moisten and soothe your throat  Tips include the following:    Try a sip of water first thing after waking up.    Keep your throat moist by drinking 6 or more glasses of clear liquids every day.    Run a cool-air humidifier in your room overnight.    Avoid cigarette smoke.     Suck on throat lozenges, cough drops, hard candy, ice chips, or frozen fruit-juice bars. Use the sugar-free versions if your diet or medical condition requires them.  Gargle to ease irritation  Gargling every hour or 2 can ease irritation. Try gargling with 1 of these solutions:    1/4 teaspoon of salt in 1/2 cup of warm water    An over-the-counter anesthetic gargle  Use medicine for more relief  Over-the-counter medicine can reduce sore throat symptoms. Ask your pharmacist if you have questions about which medicine to use:    Ease pain with anesthetic sprays. Aspirin or an aspirin substitute also helps. Remember, never give aspirin to anyone 18 or younger, or if you are already taking blood thinners.     For sore throats caused by allergies, try antihistamines to block the allergic reaction.    Remember: unless a sore throat is caused by a bacterial infection, antibiotics won t help you.  Prevent future sore throats  Prevention tips include the following:    Stop smoking or reduce contact with secondhand smoke. Smoke irritates the tender throat lining.    Limit contact with pets and with allergy-causing substances, such as pollen and mold.    When you re around someone with a sore throat or cold, wash your hands often to keep viruses or bacteria from spreading.    Don t strain your vocal cords.  Call your healthcare provider  Contact your healthcare provider if  you have:    A temperature over 101 F (38.3 C)    White spots on the throat    Great difficulty swallowing    Trouble breathing    A skin rash    Recent exposure to someone else with strep bacteria    Severe hoarseness and swollen glands in the neck or jaw   Date Last Reviewed: 8/1/2016 2000-2017 [employer]. [address] [place], [place] [address]. All rights reserved. This information is not intended as a substitute for professional medical care. Always follow your healthcare professional's instructions.

## 2018-03-04 LAB
BACTERIA SPEC CULT: NORMAL
SPECIMEN SOURCE: NORMAL

## 2018-03-14 DIAGNOSIS — F32.1 MODERATE SINGLE CURRENT EPISODE OF MAJOR DEPRESSIVE DISORDER (H): ICD-10-CM

## 2018-03-19 RX ORDER — BUPROPION HYDROCHLORIDE 150 MG/1
TABLET, EXTENDED RELEASE ORAL
Qty: 60 TABLET | Refills: 2 | Status: SHIPPED | OUTPATIENT
Start: 2018-03-19 | End: 2018-07-21

## 2018-03-19 NOTE — TELEPHONE ENCOUNTER
"Requested Prescriptions   Pending Prescriptions Disp Refills     buPROPion (WELLBUTRIN SR) 150 MG 12 hr tablet [Pharmacy Med Name: BUPROPION HCL ER (SR) 150MG TB12] 60 tablet 2     Sig: TAKE ONE TABLET BY MOUTH EVERY DAY AND INCREASE TO ONE TABLET TWO TIMES A DAY AFTER 4 TO 7 DAYS    SSRIs Protocol Failed    3/14/2018 11:42 AM       Failed - PHQ-9 score less than 5 in past 6 months    Please review last PHQ-9 score.          Passed - Medication is Bupropion    If the medication is Bupropion (Wellbutrin), and the patient is taking for smoking cessation; OK to refill.         Passed - Patient is age 18 or older       Passed - No active pregnancy on record       Passed - No positive pregnancy test in last 12 months       Passed - Recent (6 mo) or future (30 days) visit within the authorizing provider's specialty    Patient had office visit in the last 6 months or has a visit in the next 30 days with authorizing provider or within the authorizing provider's specialty.  See \"Patient Info\" tab in inbasket, or \"Choose Columns\" in Meds & Orders section of the refill encounter.            PHQ-9 score:    PHQ-9 SCORE 12/4/2017   Total Score -   Total Score 10     Routing refill request to provider for review/approval because:  Labs out of range:  PHQ-9 > 5                "

## 2018-04-02 ENCOUNTER — MYC MEDICAL ADVICE (OUTPATIENT)
Dept: INTERNAL MEDICINE | Facility: CLINIC | Age: 33
End: 2018-04-02

## 2018-04-02 DIAGNOSIS — J01.01 ACUTE RECURRENT MAXILLARY SINUSITIS: Primary | ICD-10-CM

## 2018-05-14 ENCOUNTER — MYC REFILL (OUTPATIENT)
Dept: INTERNAL MEDICINE | Facility: CLINIC | Age: 33
End: 2018-05-14

## 2018-05-14 DIAGNOSIS — E66.09 NON MORBID OBESITY DUE TO EXCESS CALORIES: ICD-10-CM

## 2018-05-14 NOTE — TELEPHONE ENCOUNTER
Message from MyChart:  Original authorizing provider: MD Dinorah Medel would like a refill of the following medications:  phentermine 30 MG capsule [Moraima Kurtz MD]    Preferred pharmacy: [place], [place] - [facility] [address]    Comment:

## 2018-05-14 NOTE — TELEPHONE ENCOUNTER
Last refill-11/14/17-#30 x1     Last OV-12/4/17      Wt Readings from Last 4 Encounters:   12/04/17 148 lb (67.1 kg)   08/01/17 143 lb (64.9 kg)   03/13/17 138 lb (62.6 kg)   12/27/16 143 lb 11.8 oz (65.2 kg)

## 2018-05-15 RX ORDER — PHENTERMINE HYDROCHLORIDE 30 MG/1
30 CAPSULE ORAL EVERY MORNING
Qty: 30 CAPSULE | Refills: 1 | Status: SHIPPED | OUTPATIENT
Start: 2018-05-15 | End: 2018-10-06

## 2018-07-17 DIAGNOSIS — N92.1 MENOMETRORRHAGIA: ICD-10-CM

## 2018-07-18 NOTE — TELEPHONE ENCOUNTER
Medication is being filled for 1 time refill only due to:  Patient needs to be seen because it has been more than one year since last visit.   My chart reminder sent.  Sachi Ayon RN

## 2018-07-21 ENCOUNTER — MYC REFILL (OUTPATIENT)
Dept: INTERNAL MEDICINE | Facility: CLINIC | Age: 33
End: 2018-07-21

## 2018-07-21 DIAGNOSIS — F32.1 MODERATE SINGLE CURRENT EPISODE OF MAJOR DEPRESSIVE DISORDER (H): ICD-10-CM

## 2018-07-23 RX ORDER — BUPROPION HYDROCHLORIDE 150 MG/1
150 TABLET, EXTENDED RELEASE ORAL 2 TIMES DAILY
Qty: 180 TABLET | Refills: 1 | Status: SHIPPED | OUTPATIENT
Start: 2018-07-23 | End: 2020-02-06

## 2018-07-23 NOTE — TELEPHONE ENCOUNTER
Message from MyChart:  Original authorizing provider: MD Dinorah Medel would like a refill of the following medications:  buPROPion (WELLBUTRIN SR) 150 MG 12 hr tablet [Moraima Kurtz MD]    Preferred pharmacy: [place], [place] - [facility] [address]    Comment:

## 2018-08-22 DIAGNOSIS — E55.9 VITAMIN D DEFICIENCY: Primary | ICD-10-CM

## 2018-08-22 PROCEDURE — 36415 COLL VENOUS BLD VENIPUNCTURE: CPT | Performed by: INTERNAL MEDICINE

## 2018-08-22 PROCEDURE — 82306 VITAMIN D 25 HYDROXY: CPT | Performed by: INTERNAL MEDICINE

## 2018-08-23 LAB — DEPRECATED CALCIDIOL+CALCIFEROL SERPL-MC: 39 UG/L (ref 20–75)

## 2018-10-06 ENCOUNTER — MYC REFILL (OUTPATIENT)
Dept: INTERNAL MEDICINE | Facility: CLINIC | Age: 33
End: 2018-10-06

## 2018-10-06 DIAGNOSIS — E66.09 NON MORBID OBESITY DUE TO EXCESS CALORIES: ICD-10-CM

## 2018-10-08 RX ORDER — PHENTERMINE HYDROCHLORIDE 30 MG/1
30 CAPSULE ORAL EVERY MORNING
Qty: 30 CAPSULE | Refills: 1 | Status: SHIPPED | OUTPATIENT
Start: 2018-10-08 | End: 2019-02-12

## 2018-10-08 NOTE — TELEPHONE ENCOUNTER
Message from MyChart:  Original authorizing provider: Young Zaidi MD, MD Dinorah Baldwin would like a refill of the following medications:  phentermine 30 MG capsule [Young Zaidi MD, MD]    Preferred pharmacy: [place], [place] - [facility] [address]    Comment:

## 2018-10-08 NOTE — TELEPHONE ENCOUNTER
Phentermine      Last Written Prescription Date:  5/15/18  Last Fill Quantity: 30,   # refills: 1  Last Office Visit: 12/4/18  Future Office visit:    Next 5 appointments (look out 90 days)     Nov 07, 2018  4:00 PM CST   [facility] Pink [facility] Moraima Kurtz [facility]    [address]  [facility] [address]   [phone]            Nov 15, 2018  3:30 PM CST   Office Visit with uL Burris DO   [facility]    [address]  [address]  [facility] [address]   [phone]                   Routing refill request to provider for review/approval because:  Drug not on the [facility], [facility] or [facility] refill protocol or controlled substance

## 2018-10-22 ENCOUNTER — MYC REFILL (OUTPATIENT)
Dept: OBGYN | Facility: CLINIC | Age: 33
End: 2018-10-22

## 2018-10-22 DIAGNOSIS — N92.1 MENOMETRORRHAGIA: ICD-10-CM

## 2018-10-22 NOTE — TELEPHONE ENCOUNTER
Pt has upcoming appt next month. Rx approved to get pt through to her appt.      Camrina Renae RN

## 2018-10-22 NOTE — TELEPHONE ENCOUNTER
Message from MyChart:  Original authorizing provider: DO Dinorah Brantley would like a refill of the following medications:  norethindrone-ethinyl estradiol (ORTHO-NOVUM 1-35 TAB,NORTREL 1-35 TAB) 1-35 MG-MCG per tablet [Lu Burris DO]    Preferred pharmacy: [facility] [address] [place] [address] AT [facility] [address]    Comment:

## 2018-11-07 ENCOUNTER — OFFICE VISIT (OUTPATIENT)
Dept: INTERNAL MEDICINE | Facility: CLINIC | Age: 33
End: 2018-11-07
Payer: COMMERCIAL

## 2018-11-07 VITALS
TEMPERATURE: 98.5 F | BODY MASS INDEX: 27.94 KG/M2 | HEART RATE: 90 BPM | HEIGHT: 61 IN | OXYGEN SATURATION: 99 % | WEIGHT: 148 LBS | DIASTOLIC BLOOD PRESSURE: 70 MMHG | SYSTOLIC BLOOD PRESSURE: 128 MMHG

## 2018-11-07 DIAGNOSIS — E66.09 NON MORBID OBESITY DUE TO EXCESS CALORIES: ICD-10-CM

## 2018-11-07 DIAGNOSIS — F32.0 MILD MAJOR DEPRESSION (H): Primary | ICD-10-CM

## 2018-11-07 PROCEDURE — 99214 OFFICE O/P EST MOD 30 MIN: CPT | Performed by: INTERNAL MEDICINE

## 2018-11-07 RX ORDER — PHENTERMINE HYDROCHLORIDE 30 MG/1
30 CAPSULE ORAL EVERY MORNING
Qty: 30 CAPSULE | Refills: 0 | Status: SHIPPED | OUTPATIENT
Start: 2018-12-06 | End: 2018-11-07

## 2018-11-07 RX ORDER — PHENTERMINE HYDROCHLORIDE 30 MG/1
30 CAPSULE ORAL EVERY MORNING
Qty: 30 CAPSULE | Refills: 0 | Status: SHIPPED | OUTPATIENT
Start: 2018-11-07 | End: 2018-11-07

## 2018-11-07 RX ORDER — PHENTERMINE HYDROCHLORIDE 30 MG/1
30 CAPSULE ORAL EVERY MORNING
Qty: 30 CAPSULE | Refills: 0 | Status: SHIPPED | OUTPATIENT
Start: 2019-01-04 | End: 2019-04-19

## 2018-11-07 ASSESSMENT — PATIENT HEALTH QUESTIONNAIRE - PHQ9: SUM OF ALL RESPONSES TO PHQ QUESTIONS 1-9: 2

## 2018-11-07 NOTE — NURSING NOTE
"/70  Pulse 90  Temp 98.5  F (36.9  C) (Oral)  Ht 5' 1.25\" (1.556 m)  Wt 148 lb (67.1 kg)  LMP 10/20/2018  SpO2 99%  Breastfeeding? No  BMI 27.74 kg/m2    "

## 2018-11-07 NOTE — PROGRESS NOTES
"  SUBJECTIVE:   Dinorah Baldwin is a 33 year old female who presents to clinic today for the following health issues:    No concerns today other than refills of phentermine and wellbutrin.    Nonmorbid obesity.  Patient would like a refill of Phentermine for weight loss.   No weight loss since last visit.  She denies any side effects to phentermine.  She has been exercising.    Depression Followup    Status since last visit: Stable     See PHQ-9 for current symptoms.  Other associated symptoms: None    Complicating factors:   Significant life event:  No   Current substance abuse:  None  Anxiety or Panic symptoms:  No    PHQ 3/13/2017 12/4/2017 11/7/2018   PHQ-9 Total Score 2 10 2   Q9: Suicide Ideation Not at all Not at all Not at all       PHQ-9  English  PHQ-9   Any Language  Suicide Assessment Five-step Evaluation and Treatment (SAFE-T)    Amount of exercise or physical activity: 2-3 days/week for an average of 45-60 minutes    Problems taking medications regularly: No    Medication side effects: none    Diet: regular (no restrictions)        PROBLEMS TO ADD ON...    Problem list and histories reviewed & adjusted, as indicated.  Additional history: as documented    Labs reviewed in EPIC    Reviewed and updated as needed this visit by clinical staff  Tobacco  Allergies  Meds  Med Hx  Surg Hx  Fam Hx  Soc Hx      Reviewed and updated as needed this visit by Provider  Allergies  Meds         ROS:  CONSTITUTIONAL: NEGATIVE for fever, chills, change in weight  RESP: NEGATIVE for significant cough or SOB  CV: NEGATIVE for chest pain, palpitations or peripheral edema    OBJECTIVE:     /70  Pulse 90  Temp 98.5  F (36.9  C) (Oral)  Ht 5' 1.25\" (1.556 m)  Wt 148 lb (67.1 kg)  LMP 10/20/2018  SpO2 99%  Breastfeeding? No  BMI 27.74 kg/m2  Body mass index is 27.74 kg/(m^2).  GENERAL: healthy, alert and no distress  RESP: lungs clear to auscultation - no rales, rhonchi or wheezes  CV: regular rate and " rhythm, normal S1 S2, no S3 or S4, no murmur, click or rub  Psych: normal affect    ASSESSMENT/PLAN:       (F32.0) Mild major depression (H)  (primary encounter diagnosis)  Comment: at goal  Plan: wellbutrin    (E66.09) Non morbid obesity due to excess calories  Comment:   Plan: phentermine 30 MG capsule, DISCONTINUED:         phentermine 30 MG capsule, DISCONTINUED:         phentermine 30 MG capsule                Moraima Kurtz MD  [facility]

## 2018-11-07 NOTE — MR AVS SNAPSHOT
After Visit Summary   11/7/2018    Dinorah Baldwin    MRN: [id number]           Patient Information     Date Of Birth          1985        Visit Information        Provider Department      11/7/2018 4:00 PM Moraima Kurtz MD [facility]        Today's Diagnoses     Mild major depression (H)    -  1    Non morbid obesity due to excess calories           Follow-ups after your visit        Your next 10 appointments already scheduled     Nov 15, 2018  3:30 PM CST   [facility]ris DO   [facility]    [address]  [address]  [facility] [address]   [phone]           Bring a current list of meds and any records pertaining to this visit. For Physicals, please bring immunization records and any forms needing to be filled out. Please arrive 10 minutes early to complete paperwork.              Who to contact     If you have questions or need follow up information about today's clinic visit or your schedule please contact [facility] directly at [phone].  Normal or non-critical lab and imaging results will be communicated to you by [employer], letter or phone within 4 business days after the clinic has received the results. If you do not hear from us within 7 days, please contact the clinic through [employer] or phone. If you have a critical or abnormal lab result, we will notify you by phone as soon as possible.  Submit refill requests through [employer] or call your pharmacy and they will forward the refill request to us. Please allow 3 business days for your refill to be completed.          Additional Information About Your Visit        MyChart Information     [employer] gives you secure access to your electronic health record. If you see a primary care provider, you can also send messages to your care team and make appointments. If you have questions, please call your primary care  "clinic.  If you do not have a primary care provider, please call [phone] and they will assist you.        Care EveryWhere ID     This is your Care EveryWhere ID. This could be used by other organizations to access your [place] medical records  [id number]        Your Vitals Were     Pulse Temperature Height Last Period Pulse Oximetry Breastfeeding?    90 98.5  F (36.9  C) (Oral) 5' 1.25\" (1.556 m) 10/20/2018 99% No    BMI (Body Mass Index)                   27.74 kg/m2            Blood Pressure from Last 3 Encounters:   11/07/18 128/70   12/04/17 120/80   08/01/17 120/72    Weight from Last 3 Encounters:   11/07/18 148 lb (67.1 kg)   12/04/17 148 lb (67.1 kg)   08/01/17 143 lb (64.9 kg)              Today, you had the following     No orders found for display         Today's Medication Changes          These changes are accurate as of 11/7/18  4:48 PM.  If you have any questions, ask your nurse or doctor.               These medicines have changed or have updated prescriptions.        Dose/Directions    * phentermine 30 MG capsule   This may have changed:  Another medication with the same name was added. Make sure you understand how and when to take each.   Used for:  Non morbid obesity due to excess calories   Changed by:  Moraima Kurtz MD        Dose:  30 mg   Take 1 capsule (30 mg) by mouth every morning   Quantity:  30 capsule   Refills:  1       * phentermine 30 MG capsule   This may have changed:  You were already taking a medication with the same name, and this prescription was added. Make sure you understand how and when to take each.   Used for:  Non morbid obesity due to excess calories   Changed by:  Moraima Kurtz MD        Dose:  30 mg   Start taking on:  1/4/2019   Take 1 capsule (30 mg) by mouth every morning   Quantity:  30 capsule   Refills:  0       * Notice:  This list has 2 medication(s) that are the same as other medications prescribed for you. Read the directions carefully, " and ask your doctor or other care provider to review them with you.         Where to get your medicines      Some of these will need a paper prescription and others can be bought over the counter.  Ask your nurse if you have questions.     Bring a paper prescription for each of these medications     phentermine 30 MG capsule                Primary Care Provider Office Phone # Fax #    Moraima Clark Kurtz [phone]       [address] [facility] [address]        Equal Access to Services     DREW HOFF : Hadii aad ku hadasho Soomaali, waaxda luqadaha, qaybta kaalmada adeegyada, waxay idiin hayaan adeeg kharash la'danii . So [facility] [phone].    ATENCIÓN: Si habla español, tiene a cantrell disposición servicios gratuitos de asistencia lingüística. [facility] [phone].    We comply with applicable federal civil rights laws and [place] laws. We do not discriminate on the basis of race, color, national origin, age, disability, sex, sexual orientation, or gender identity.            Thank you!     Thank you for choosing [facility]  for your care. Our goal is always to provide you with excellent care. Hearing back from our patients is one way we can continue to improve our services. Please take a few minutes to complete the written survey that you may receive in the mail after your visit with us. Thank you!             Your Updated Medication List - Protect others around you: Learn how to safely use, store and throw away your medicines at www.disposemymeds.org.          This list is accurate as of 11/7/18  4:48 PM.  Always use your most recent med list.                   Brand Name Dispense Instructions for use Diagnosis    buPROPion 150 MG 12 hr tablet    WELLBUTRIN SR    180 tablet    Take 1 tablet (150 mg) by mouth 2 times daily    Moderate single current episode of major depressive disorder (H)       LORazepam 0.5 MG tablet    ATIVAN     Take 0.5 mg by mouth as needed for  anxiety        MULTI ADULT GUMMIES PO      Take 1 chew tab by mouth three times a week        NEXPLANON SC      by IMPLANT route continuous        norethindrone-ethinyl estradiol 1-35 MG-MCG per tablet    ORTHO-NOVUM 1-35 TAB,NORTREL 1-35 TAB    90 tablet    Take 1 tablet by mouth daily    Menometrorrhagia       * phentermine 30 MG capsule     30 capsule    Take 1 capsule (30 mg) by mouth every morning    Non morbid obesity due to excess calories       * phentermine 30 MG capsule   Start taking on:  1/4/2019     30 capsule    Take 1 capsule (30 mg) by mouth every morning    Non morbid obesity due to excess calories       * Notice:  This list has 2 medication(s) that are the same as other medications prescribed for you. Read the directions carefully, and ask your doctor or other care provider to review them with you.

## 2018-11-15 ENCOUNTER — OFFICE VISIT (OUTPATIENT)
Dept: OBGYN | Facility: CLINIC | Age: 33
End: 2018-11-15
Payer: COMMERCIAL

## 2018-11-15 VITALS
BODY MASS INDEX: 28.43 KG/M2 | HEIGHT: 61 IN | DIASTOLIC BLOOD PRESSURE: 82 MMHG | SYSTOLIC BLOOD PRESSURE: 132 MMHG | WEIGHT: 150.6 LBS

## 2018-11-15 DIAGNOSIS — Z00.00 ENCOUNTER FOR PREVENTATIVE ADULT HEALTH CARE EXAMINATION: Primary | ICD-10-CM

## 2018-11-15 DIAGNOSIS — Z30.017 ENCOUNTER FOR INITIAL PRESCRIPTION OF IMPLANTABLE SUBDERMAL CONTRACEPTIVE: ICD-10-CM

## 2018-11-15 DIAGNOSIS — N92.1 MENOMETRORRHAGIA: ICD-10-CM

## 2018-11-15 PROCEDURE — 99395 PREV VISIT EST AGE 18-39: CPT | Mod: 25 | Performed by: FAMILY MEDICINE

## 2018-11-15 PROCEDURE — 87624 HPV HI-RISK TYP POOLED RSLT: CPT | Performed by: FAMILY MEDICINE

## 2018-11-15 PROCEDURE — 11983 REMOVE/INSERT DRUG IMPLANT: CPT | Performed by: FAMILY MEDICINE

## 2018-11-15 PROCEDURE — G0145 SCR C/V CYTO,THINLAYER,RESCR: HCPCS | Performed by: FAMILY MEDICINE

## 2018-11-15 NOTE — MR AVS SNAPSHOT
After Visit Summary   11/15/2018    Dinorah Baldwin    MRN: [id number]           Patient Information     Date Of Birth          1985        Visit Information        Provider Department      11/15/2018 3:30 PM Lu Burris, DO [facility]        Today's Diagnoses     Encounter for preventative adult health care examination    -  1    Menometrorrhagia          Care Instructions    Return yearly     Call Lab [phone] [place] or [phone] [place] to schedule future labs. [facility]   [facility]  If you are getting cholesterol checked please fast 8 hours     Dr. Lu Burris, DO    Obstetrics and Gynecology  [facility] and [place]                       Follow-ups after your visit        Future tests that were ordered for you today     Open Future Orders        Priority Expected Expires Ordered    CBC with platelets Routine  11/15/2019 11/15/2018    Comprehensive metabolic panel Routine  11/15/2019 11/15/2018    Lipid panel reflex to direct LDL Fasting Routine  11/15/2019 11/15/2018    TSH with free T4 reflex Routine  11/15/2019 11/15/2018            Who to contact     If you have questions or need follow up information about today's clinic visit or your schedule please contact [facility] directly at [phone].  Normal or non-critical lab and imaging results will be communicated to you by MyChart, letter or phone within 4 business days after the clinic has received the results. If you do not hear from us within 7 days, please contact the clinic through MyChart or phone. If you have a critical or abnormal lab result, we will notify you by phone as soon as possible.  Submit refill requests through [employer] or call your pharmacy and they will forward the refill request to us. Please allow 3 business days for your refill to be completed.          Additional Information About Your Visit       "  MyChart Information     [employer] gives you secure access to your electronic health record. If you see a primary care provider, you can also send messages to your care team and make appointments. If you have questions, please call your primary care clinic.  If you do not have a primary care provider, please call [phone] and they will assist you.        Care EveryWhere ID     This is your Care EveryWhere ID. This could be used by other organizations to access your [place] medical records  [id number]        Your Vitals Were     Height Last Period BMI (Body Mass Index)             5' 1.25\" (1.556 m) 10/20/2018 28.22 kg/m2          Blood Pressure from Last 3 Encounters:   11/15/18 132/82   11/07/18 128/70   12/04/17 120/80    Weight from Last 3 Encounters:   11/15/18 150 lb 9.6 oz (68.3 kg)   11/07/18 148 lb (67.1 kg)   12/04/17 148 lb (67.1 kg)                 Where to get your medicines      These medications were sent to [facility] [address] [place] [address] [address] [facility] AT [facility] [address]  [address] [facility] [address]     Phone:  [phone]     norethindrone-ethinyl estradiol 1-35 MG-MCG per tablet          Primary Care Provider Office Phone # Fax #    Moraima Kurtz [phone]       [address] [address]        Equal Access to Services     DREW HOFF AH: Hadii aad ku hadasho Soomaali, waaxda luqadaha, qaybta kaalmada adeegyada, waxtracee idiinocente motta. So [facility] [phone].    ATENCIÓN: Si habla español, tiene a cantrell disposición servicios gratuitos de asistencia lingüística. Llame al [phone].    We comply with applicable federal civil rights laws and [place] laws. We do not discriminate on the basis of race, color, national origin, age, disability, sex, sexual orientation, or gender identity.            Thank you!     Thank you for choosing [facility]  for your care. Our goal is always to provide you " with excellent care. Hearing back from our patients is one way we can continue to improve our services. Please take a few minutes to complete the written survey that you may receive in the mail after your visit with us. Thank you!             Your Updated Medication List - Protect others around you: Learn how to safely use, store and throw away your medicines at www.disposemymeds.org.          This list is accurate as of 11/15/18  4:08 PM.  Always use your most recent med list.                   Brand Name Dispense Instructions for use Diagnosis    buPROPion 150 MG 12 hr tablet    WELLBUTRIN SR    180 tablet    Take 1 tablet (150 mg) by mouth 2 times daily    Moderate single current episode of major depressive disorder (H)       LORazepam 0.5 MG tablet    ATIVAN     Take 0.5 mg by mouth as needed for anxiety        MULTI ADULT GUMMIES PO      Take 1 chew tab by mouth three times a week        NEXPLANON SC      by IMPLANT route continuous        norethindrone-ethinyl estradiol 1-35 MG-MCG per tablet    ORTHO-NOVUM 1-35 TAB,NORTREL 1-35 TAB    90 tablet    Take 1 tablet by mouth daily    Menometrorrhagia, Encounter for preventative adult health care examination       * phentermine 30 MG capsule     30 capsule    Take 1 capsule (30 mg) by mouth every morning    Non morbid obesity due to excess calories       * phentermine 30 MG capsule   Start taking on:  1/4/2019     30 capsule    Take 1 capsule (30 mg) by mouth every morning    Non morbid obesity due to excess calories       * Notice:  This list has 2 medication(s) that are the same as other medications prescribed for you. Read the directions carefully, and ask your doctor or other care provider to review them with you.

## 2018-11-15 NOTE — PROGRESS NOTES
SUBJECTIVE:  Dinorah Baldwin is an 33 year old  woman who presents for   annual gyn exam. Patient's last menstrual period was 10/20/2018. Periods are regular q 28-30 days, lasting 4 days. Dysmenorrhea:none. Cyclic symptoms include none. No intermenstrual bleeding, spotting, or discharge. STD hx: none.    Current contraception: Nexplanon  SAMUEL exposure: no  History of abnormal Pap smear: No  Family history of uterine or ovarian cancer: No  Regular self breast exam: Yes  History of abnormal mammogram: No  Family history of breast cancer: No  History of abnormal lipids: No      - Pt is also taking OCP to alleviate abnormal spotting between menses, finds this works best as she does not want to switch to another form of contraception.       Past Medical History:   Diagnosis Date     ALLERGIC RHINITIS      Closed fracture of unspecified part of humerus     Left     DEPRESSIVE DISORDER      Depressive disorder     Have had dx of depression multiple times simce adolescence     Tobacco use disorder      Unspecified closed fracture of ankle     Ankle fracture        Family History   Problem Relation Age of Onset     Cerebrovascular Disease Father      Alcohol/Drug Father      Depression Father      Lipids Father      Alcohol/Drug Brother      Depression Brother      Arthritis Mother      Depression Mother      Depression Sister      Alcohol/Drug Brother      Hypertension Paternal Grandmother      Alzheimer Disease Maternal Grandmother      Colon Cancer Other      Breast Cancer No family hx of      Colon Cancer No family hx of        Past Surgical History:   Procedure Laterality Date     ENT SURGERY  2016    tonsills     EXCISE LESION TRUNK N/A 2016    Procedure: EXCISE LESION TRUNK;  Surgeon: Kris Younger MD;  Location: [facility] OR       Current Outpatient Prescriptions   Medication     buPROPion (WELLBUTRIN SR) 150 MG 12 hr tablet     LORazepam (ATIVAN) 0.5 MG tablet     Multiple  "Vitamins-Minerals (MULTI ADULT GUMMIES PO)     norethindrone-ethinyl estradiol (ORTHO-NOVUM 1-35 TAB,NORTREL 1-35 TAB) 1-35 MG-MCG per tablet     phentermine 30 MG capsule     Etonogestrel (NEXPLANON SC)     [START ON 1/4/2019] phentermine 30 MG capsule     [DISCONTINUED] norethindrone-ethinyl estradiol (ORTHO-NOVUM 1-35 TAB,NORTREL 1-35 TAB) 1-35 MG-MCG per tablet     No current facility-administered medications for this visit.      Allergies   Allergen Reactions     Zyrtec [Cetirizine Hcl] Rash       Social History   Substance Use Topics     Smoking status: Former Smoker     Years: 1.00     Quit date: 10/22/2011     Smokeless tobacco: Never Used      Comment: 1 pack every 2.5 days     Alcohol use Yes      Comment: Limited use 2x s mnth glass of wine or 2       Review Of Systems  Ears/Nose/Throat: negative  Respiratory: No shortness of breath, dyspnea on exertion, cough, or hemoptysis  Cardiovascular: negative  Gastrointestinal: negative  Genitourinary: negative  Constitutional, HEENT, cardiovascular, pulmonary, GI, , musculoskeletal, neuro, skin, endocrine and psych systems are negative, except as otherwise noted.      This document serves as a record of the services and decisions personally performed and made by Lu Burris DO. It was created on her behalf by Renee Gilbert, a trained medical scribe. The creation of this document is based the provider's statements to the medical scribe.  Scribprateek Gilbert 3:54 PM, November 15, 2018    OBJECTIVE:  /82  Ht 1.556 m (5' 1.25\")  Wt 68.3 kg (150 lb 9.6 oz)  LMP 10/20/2018  BMI 28.22 kg/m2  General appearance: healthy, alert and no distress  Skin: Skin color, texture, turgor normal. No rashes or lesions.  Ears: negative  Nose/Sinuses: Nares normal. Septum midline. Mucosa normal. No drainage or sinus tenderness.  Oropharynx: Lips, mucosa, and tongue normal. Teeth and gums normal.  Neck: Neck supple. No adenopathy. Thyroid symmetric, normal size,, Carotids " without bruits.  Lungs: negative, Percussion normal. Good diaphragmatic excursion. Lungs clear  Heart: negative, PMI normal. No lifts, heaves, or thrills. RRR. No murmurs, clicks gallops or rub  Breasts: Inspection negative. No nipple discharge or bleeding. No masses.  Abdomen: Abdomen soft, non-tender. BS normal. No masses, organomegaly  Pelvic: Pelvic examination with pap/without Gonorrhea and Chlamydia   including  External genitalia normal   and vagina normal rugatted not atrophic  Examination of urethra  normal no masses, tenderness, scarring  bladder, no masses or tenderness  Cervix no lesions or discharge  Bimanual exam with   Uterus 6 weeks size, mid position, mobile, no tenderness, no descent   Adnexa/parametria normal            Procedure:  Nexplanon removal & replacement  The Implanon/Nexplanon rita was located in the left arm and the area cleansed with betadine.  1% lidocaine with epinephrine was injected into the area and a small skin incision made using a scalpel.  The Implanon/Nexplanon was gently manipulated until the tip was at the incision.  A Bella clamp was used to remove the Nexplanon completely intact.  A small stab incision is made through the incision for removal. The Nexplanon device is then placed just under the skin with care not to go too deep.  The device is then slowly removed, leaving the rita behind.  The rita is palpable in the appropriate place under the skin.  The incision is noted to be hemostatic and the area is wrapped with a steristrips and tape.      There were no complications and minimal blood loss.  The incision site was hemostatic and a steri-strip applied to the area.            ASSESSMENT:  Dinorah Baldwin is an 33 year old  woman who presents for   annual gyn exam.     PLAN:  Dx: Annual gyn physical; Contraception  1)  Will return for fasting labs; Pap smear - pathology pending; Mammogram not indicated at this age  2)  Contraception: Nexplanon replaced today in  clinic, OCP refilled   - Would consider tubal ligation, full procedure & recovery explained -- will  report if/when she desires this  3)  Return to clinic in 1 year for annual gyn physical, otherwise as needed.      Rx:  - norethindrone-ethinyl estradiol 1-35 mg-mcg 1 tab po QD        PE:  Reviewed health maintenance including diet, regular exercise   and periodic exams.        The information in this document, created by the medical scribe for me, accurately reflects the services I personally performed and the decisions made by me. I have reviewed and approved this document for accuracy prior to leaving the patient care area.  3:54 PM, 11/15/18    Dr. Lu Burris, DO    Obstetrics and Gynecology  [facility] [place]

## 2018-11-15 NOTE — PATIENT INSTRUCTIONS
Return yearly     Call Lab [phone] [place] or [phone] [place] to schedule future labs. You may schedule   The labs at any [place] facitily.  If you are getting cholesterol checked please fast 8 hours     Dr. Lu Burris, DO    Obstetrics and Gynecology  [facility] [place]

## 2018-11-15 NOTE — NURSING NOTE
Nexplanon removed and replaced with new Nexplanon.  Lot: [id number]  Exp: 01/2021  NDC: 7623-9652-10  Bessy Molina CMA    1% Lidocaine used for local block.  Lot: -[id number]  Exp: 2/1/2019  NDC: 6238-3650-85  Bessy Molina CMA

## 2018-11-15 NOTE — NURSING NOTE
"Chief Complaint   Patient presents with     Gyn Exam     pap due     Contraception     remove and replace Nexplanon       Initial /82  Ht 5' 1.25\" (1.556 m)  Wt 150 lb 9.6 oz (68.3 kg)  LMP 10/20/2018  BMI 28.22 kg/m2 Estimated body mass index is 28.22 kg/(m^2) as calculated from the following:    Height as of this encounter: 5' 1.25\" (1.556 m).    Weight as of this encounter: 150 lb 9.6 oz (68.3 kg).  BP completed using cuff size: regular    Questioned patient about current smoking habits.  Pt. quit smoking some time ago.          The following HM Due: pap smear    "

## 2018-11-17 ENCOUNTER — OFFICE VISIT (OUTPATIENT)
Dept: URGENT CARE | Facility: URGENT CARE | Age: 33
End: 2018-11-17
Payer: COMMERCIAL

## 2018-11-17 VITALS
TEMPERATURE: 97.5 F | DIASTOLIC BLOOD PRESSURE: 101 MMHG | OXYGEN SATURATION: 99 % | SYSTOLIC BLOOD PRESSURE: 157 MMHG | WEIGHT: 148.5 LBS | BODY MASS INDEX: 27.83 KG/M2 | HEART RATE: 77 BPM

## 2018-11-17 DIAGNOSIS — H10.31 ACUTE BACTERIAL CONJUNCTIVITIS OF RIGHT EYE: Primary | ICD-10-CM

## 2018-11-17 PROCEDURE — 99213 OFFICE O/P EST LOW 20 MIN: CPT | Performed by: FAMILY MEDICINE

## 2018-11-17 RX ORDER — POLYMYXIN B SULFATE AND TRIMETHOPRIM 1; 10000 MG/ML; [USP'U]/ML
1 SOLUTION OPHTHALMIC EVERY 4 HOURS
Qty: 10 ML | Refills: 0 | Status: SHIPPED | OUTPATIENT
Start: 2018-11-17 | End: 2018-11-23

## 2018-11-17 NOTE — PATIENT INSTRUCTIONS
Bacterial Conjunctivitis    You have an infection in the membranes covering the white part of the eye. This part of the eye is called the conjunctiva. The infection is called conjunctivitis. The most common symptoms of conjunctivitis include a thick, pus-like discharge from the eye, swollen eyelids, redness, eyelids sticking together upon awakening, and a gritty or scratchy feeling in the eye. Your infection was caused by bacteria. It may be treated with medicine. With treatment, the infection takes about 7 to 10 days to resolve.  Home care    Use prescribed antibiotic eye drops or ointment as directed to treat the infection.    Apply a warm compress (towel soaked in warm water) to the affected eye 3 to 4 times a day. Do this just before applying medicine to the eye.    Use a warm, wet cloth to wipe away crusting of the eyelids in the morning. This is caused by mucus drainage during the night. You may also use saline irrigating solution or artificial tears to rinse away mucus in the eye. Do not put a patch over the eye.    Wash your hands before and after touching the infected eye. This is to prevent spreading the infection to the other eye, and to other people. Don't share your towels or washcloths with others.    You may use acetaminophen or ibuprofen to control pain, unless another medicine was prescribed. (Note: If you have chronic liver or kidney disease or have ever had a stomach ulcer or gastrointestinal bleeding, talk with your doctor before using these medicines.)    Don't wear contact lenses until your eyes have healed and all symptoms are gone.  Follow-up care  Follow up with your healthcare provider, or as advised.  When to seek medical advice  Call your healthcare provider right away if any of these occur:    Worsening vision    Increasing pain in the eye    Increasing swelling or redness of the eyelid    Redness spreading around the eye  Date Last Reviewed: 7/1/2017 2000-2018 [employer],  [facility]. [address] [place] [address]. All rights reserved. This information is not intended as a substitute for professional medical care. Always follow your healthcare professional's instructions.

## 2018-11-17 NOTE — PROGRESS NOTES
SUBJECTIVE:   Chief Complaint   Patient presents with     Urgent Care     Right eye pink and pressure behind     Dinorah Baldwin is a 33 year old female with burning, redness, discharge and mattering in right eye for 1/2  days.  No other symptoms.  No significant prior ophthalmological history. No change in visual acuity, no photophobia, no severe eye pain.      No foreign body sensation,  No allergic itching of nose or bilateral eyes.  No irritation from make-up, skin or hair care products    Patient does not  use contact lenses.=  Works at [employer]    Past Medical History:   Diagnosis Date     ALLERGIC RHINITIS      Closed fracture of unspecified part of humerus 1997    Left     DEPRESSIVE DISORDER      Depressive disorder     Have had dx of depression multiple times simce adolescence     Tobacco use disorder      Unspecified closed fracture of ankle 1993    Ankle fracture     Patient Active Problem List   Diagnosis     DEPRESSIVE DISORDER     Non morbid obesity due to excess calories     Right-sided chest wall pain       ALLERGIES:  Zyrtec [cetirizine hcl]      Current Outpatient Prescriptions on File Prior to Visit:  buPROPion (WELLBUTRIN SR) 150 MG 12 hr tablet Take 1 tablet (150 mg) by mouth 2 times daily   Etonogestrel (NEXPLANON SC) by IMPLANT route continuous    LORazepam (ATIVAN) 0.5 MG tablet Take 0.5 mg by mouth as needed for anxiety   Multiple Vitamins-Minerals (MULTI ADULT GUMMIES PO) Take 1 chew tab by mouth three times a week   norethindrone-ethinyl estradiol (ORTHO-NOVUM 1-35 TAB,NORTREL 1-35 TAB) 1-35 MG-MCG per tablet Take 1 tablet by mouth daily   [START ON 1/4/2019] phentermine 30 MG capsule Take 1 capsule (30 mg) by mouth every morning   phentermine 30 MG capsule Take 1 capsule (30 mg) by mouth every morning     No current facility-administered medications on file prior to visit.     Social History   Substance Use Topics     Smoking status: Former Smoker     Years: 1.00     Quit  date: 10/22/2011     Smokeless tobacco: Never Used      Comment: 1 pack every 2.5 days     Alcohol use Yes      Comment: Limited use 2x s mnth glass of wine or 2       Family History   Problem Relation Age of Onset     Cerebrovascular Disease Father      Alcohol/Drug Father      Depression Father      Lipids Father      Alcohol/Drug Brother      Depression Brother      Arthritis Mother      Depression Mother      Depression Sister      Alcohol/Drug Brother      Hypertension Paternal Grandmother      Alzheimer Disease Maternal Grandmother      Colon Cancer Other      Breast Cancer No family hx of      Colon Cancer No family hx of          ROS:  CONSTITUTIONAL:NEGATIVE for fever, chills,     INTEGUMENTARY/SKIN: NEGATIVE for worrisome rashes,    ENT/MOUTH: NEGATIVE for ear, mouth and throat problems  RESP:NEGATIVE for significant cough or SOB    OBJECTIVE:   BP (!) 157/101 (BP Location: Right arm, Patient Position: Chair, Cuff Size: Adult Regular)  Pulse 77  Temp 97.5  F (36.4  C) (Tympanic)  Wt 148 lb 8 oz (67.4 kg)  LMP 10/20/2018  SpO2 99%  BMI 27.83 kg/m2    Patient appears well,  .     Eyes: right eye with findings of typical conjunctivitis noted; erythema and discharge. PERRLA, no foreign body noted. No periorbital cellulitis. The corneas are clear and fundi normal. Visual acuity normal.      HENT: External ears with no swelling or lesions   Nose and lips without  Swelling, ulcers, erythema or lesions  TMs normal, pearly  NECK: normal pain free ROM  RESP: no labored respirations, no tachypnea  EXTREMITIES:   Full ROM without expression of pain or limitation x 4 extremities  NEURO: Normal strength and tone,   normal speech and mentation   PSYCH: mentation and affect appears normal and patient appearance--appropriately groomed          ASSESSMENT:   Acute bacterial conjunctivitis of right eye     - trimethoprim-polymyxin b (POLYTRIM) ophthalmic solution; Apply 1 drop to eye every 4 hours for 6 days          Antibiotic drops per order. Hygiene discussed- frequent hand washing and to not share towels, washcloths or pillows to prevent transmission within the household.   Call prn.    Discussed that the most common conjunctival infections are caused by viruses, however the eye drainage is easily superinfected by bacteria.  When treating with antibiotic eye drops, the bacterial infection will respond to the antibiotics, but a viral infection will not respond, and can continue to be infectious until the eye injection and draining is resolved  (2-7 days)

## 2018-11-17 NOTE — NURSING NOTE
"Chief Complaint   Patient presents with     Urgent Care     Right eye pink and pressure behind        Initial BP (!) 157/101 (BP Location: Right arm, Patient Position: Chair, Cuff Size: Adult Regular)  Pulse 77  Temp 97.5  F (36.4  C) (Tympanic)  Wt 148 lb 8 oz (67.4 kg)  LMP 10/20/2018  SpO2 99%  BMI 27.83 kg/m2 Estimated body mass index is 27.83 kg/(m^2) as calculated from the following:    Height as of 11/15/18: 5' 1.25\" (1.556 m).    Weight as of this encounter: 148 lb 8 oz (67.4 kg)..    BP completed using cuff size: regular  MEDICATIONS REVIEWED  SOCIAL AND FAMILY HX REVIEWED  Laura Benitez CMA  "

## 2018-11-17 NOTE — MR AVS SNAPSHOT
After Visit Summary   11/17/2018    Dinorah Baldwin    MRN: [id number]           Patient Information     Date Of Birth          1985        Visit Information        Provider Department      11/17/2018 10:20 AM Yolanda Vega MD [facility]        Today's Diagnoses     Acute bacterial conjunctivitis of right eye    -  1      Care Instructions      Bacterial Conjunctivitis    You have an infection in the membranes covering the white part of the eye. This part of the eye is called the conjunctiva. The infection is called conjunctivitis. The most common symptoms of conjunctivitis include a thick, pus-like discharge from the eye, swollen eyelids, redness, eyelids sticking together upon awakening, and a gritty or scratchy feeling in the eye. Your infection was caused by bacteria. It may be treated with medicine. With treatment, the infection takes about 7 to 10 days to resolve.  Home care    Use prescribed antibiotic eye drops or ointment as directed to treat the infection.    Apply a warm compress (towel soaked in warm water) to the affected eye 3 to 4 times a day. Do this just before applying medicine to the eye.    Use a warm, wet cloth to wipe away crusting of the eyelids in the morning. This is caused by mucus drainage during the night. You may also use saline irrigating solution or artificial tears to rinse away mucus in the eye. Do not put a patch over the eye.    Wash your hands before and after touching the infected eye. This is to prevent spreading the infection to the other eye, and to other people. Don't share your towels or washcloths with others.    You may use acetaminophen or ibuprofen to control pain, unless another medicine was prescribed. (Note: If you have chronic liver or kidney disease or have ever had a stomach ulcer or gastrointestinal bleeding, talk with your doctor before using these medicines.)    Don't wear contact lenses until your eyes have healed  and all symptoms are gone.  Follow-up care  Follow up with your healthcare provider, or as advised.  When to seek medical advice  Call your healthcare provider right away if any of these occur:    Worsening vision    Increasing pain in the eye    Increasing swelling or redness of the eyelid    Redness spreading around the eye  Date Last Reviewed: 7/1/2017 2000-2018 [employer]. [address] [place] [address]. All rights reserved. This information is not intended as a substitute for professional medical care. Always follow your healthcare professional's instructions.                Follow-ups after your visit        Who to contact     If you have questions or need follow up information about today's clinic visit or your schedule please contact [facility] directly at [phone].  Normal or non-critical lab and imaging results will be communicated to you by MyChart, letter or phone within 4 business days after the clinic has received the results. If you do not hear from us within 7 days, please contact the clinic through [employer] or phone. If you have a critical or abnormal lab result, we will notify you by phone as soon as possible.  Submit refill requests through [employer] or call your pharmacy and they will forward the refill request to us. Please allow 3 business days for your refill to be completed.          Additional Information About Your Visit        [employer] Information     [employer] gives you secure access to your electronic health record. If you see a primary care provider, you can also send messages to your care team and make appointments. If you have questions, please call your primary care clinic.  If you do not have a primary care provider, please call [phone] and they will assist you.        Care EveryWhere ID     This is your Care EveryWhere ID. This could be used by other organizations to access your [facility] medical records  [id number]        Your  Vitals Were     Pulse Temperature Last Period Pulse Oximetry BMI (Body Mass Index)       77 97.5  F (36.4  C) (Tympanic) 10/20/2018 99% 27.83 kg/m2        Blood Pressure from Last 3 Encounters:   11/17/18 (!) 157/101   11/15/18 132/82   11/07/18 128/70    Weight from Last 3 Encounters:   11/17/18 148 lb 8 oz (67.4 kg)   11/15/18 150 lb 9.6 oz (68.3 kg)   11/07/18 148 lb (67.1 kg)              Today, you had the following     No orders found for display         Today's Medication Changes          These changes are accurate as of 11/17/18 11:05 AM.  If you have any questions, ask your nurse or doctor.               Start taking these medicines.        Dose/Directions    trimethoprim-polymyxin b ophthalmic solution   Commonly known as:  POLYTRIM   Used for:  Acute bacterial conjunctivitis of right eye   Started by:  Yolanda Vega MD        Dose:  1 drop   Apply 1 drop to eye every 4 hours for 6 days   Quantity:  10 mL   Refills:  0            Where to get your medicines      These medications were sent to [facility] [address] [place] [address] [address]TH [facility] [address]  [address] [place] [id number]     Phone:  [phone]     trimethoprim-polymyxin b ophthalmic solution                Primary Care Provider Office Phone # Fax #    Moraima Clark Kurtz [phone]       [address] [facility] [address]        Equal Access to Services     [facility]: Hadii sony ku hadasho Soomaali, waaxda luqadaha, qaybta kaalmada adeegyada, ginette motat. So [facility] [phone].    ATENCIÓN: Si habla eliseoañol, tiene a cantrell disposición servicios gratuitos de asistencia lingüística. Llame al [phone].    We comply with applicable federal civil rights laws and [place] laws. We do not discriminate on the basis of race, color, national origin, age, disability, sex, sexual orientation, or gender identity.            Thank you!     Thank you for choosing  [facility]  for your care. Our goal is always to provide you with excellent care. Hearing back from our patients is one way we can continue to improve our services. Please take a few minutes to complete the written survey that you may receive in the mail after your visit with us. Thank you!             Your Updated Medication List - Protect others around you: Learn how to safely use, store and throw away your medicines at www.disposemymeds.org.          This list is accurate as of 11/17/18 11:05 AM.  Always use your most recent med list.                   Brand Name Dispense Instructions for use Diagnosis    buPROPion 150 MG 12 hr tablet    WELLBUTRIN SR    180 tablet    Take 1 tablet (150 mg) by mouth 2 times daily    Moderate single current episode of major depressive disorder (H)       LORazepam 0.5 MG tablet    ATIVAN     Take 0.5 mg by mouth as needed for anxiety        MULTI ADULT GUMMIES PO      Take 1 chew tab by mouth three times a week        NEXPLANON SC      by IMPLANT route continuous        norethindrone-ethinyl estradiol 1-35 MG-MCG per tablet    ORTHO-NOVUM 1-35 TAB,NORTREL 1-35 TAB    90 tablet    Take 1 tablet by mouth daily    Menometrorrhagia, Encounter for preventative adult health care examination       * phentermine 30 MG capsule     30 capsule    Take 1 capsule (30 mg) by mouth every morning    Non morbid obesity due to excess calories       * phentermine 30 MG capsule   Start taking on:  1/4/2019     30 capsule    Take 1 capsule (30 mg) by mouth every morning    Non morbid obesity due to excess calories       trimethoprim-polymyxin b ophthalmic solution    POLYTRIM    10 mL    Apply 1 drop to eye every 4 hours for 6 days    Acute bacterial conjunctivitis of right eye       * Notice:  This list has 2 medication(s) that are the same as other medications prescribed for you. Read the directions carefully, and ask your doctor or other care provider to review them with you.

## 2018-11-20 ENCOUNTER — TELEPHONE (OUTPATIENT)
Dept: INTERNAL MEDICINE | Facility: CLINIC | Age: 33
End: 2018-11-20

## 2018-11-20 LAB
COPATH REPORT: NORMAL
PAP: NORMAL

## 2018-11-20 NOTE — TELEPHONE ENCOUNTER
PA Initiation    Medication: PHENTERMINE 30MG  Insurance Company: [employer] - Phone [phone] Fax [phone]  Pharmacy Filling the Rx: [facility] [address] [place] [address] AT [facility] [address]  Filling Pharmacy Phone: [phone]  Filling Pharmacy Fax:    Start Date: 11/20/2018

## 2018-11-20 NOTE — TELEPHONE ENCOUNTER
Medication Phentermine not cover by insurance.   DX: Non morbid obesity due to excess calories [E66.09]   Alternatives:   Patient's ID #: [id number]  [facility] Contact info: [phone]    NOTES:

## 2018-11-21 LAB
FINAL DIAGNOSIS: NORMAL
HPV HR 12 DNA CVX QL NAA+PROBE: NEGATIVE
HPV16 DNA SPEC QL NAA+PROBE: NEGATIVE
HPV18 DNA SPEC QL NAA+PROBE: NEGATIVE
SPECIMEN DESCRIPTION: NORMAL
SPECIMEN SOURCE CVX/VAG CYTO: NORMAL

## 2018-11-21 NOTE — TELEPHONE ENCOUNTER
Prior Authorization Approval    Authorization Effective Date: 11/21/2018  Authorization Expiration Date: 5/20/2019  Medication: PHENTERMINE 30MG - APPROVED  Approved Dose/Quantity: DAILY  Reference #: [id number]   Insurance Company: [email or web address] - Phone [phone] Fax [phone]  Expected CoPay: NA     CoPay Card Available:      Foundation Assistance Needed:    Which Pharmacy is filling the prescription (Not needed for infusion/clinic administered): [facility] [address] [place] [address] [facility] [address]  Pharmacy Notified: Yes  Patient Notified: No

## 2018-12-24 ENCOUNTER — E-VISIT (OUTPATIENT)
Dept: INTERNAL MEDICINE | Facility: CLINIC | Age: 33
End: 2018-12-24
Payer: COMMERCIAL

## 2018-12-24 DIAGNOSIS — J01.00 ACUTE MAXILLARY SINUSITIS, RECURRENCE NOT SPECIFIED: Primary | ICD-10-CM

## 2018-12-24 PROCEDURE — 99444 ZZC PHYSICIAN ONLINE EVALUATION & MANAGEMENT SERVICE: CPT | Performed by: INTERNAL MEDICINE

## 2018-12-24 RX ORDER — AMOXICILLIN 500 MG/1
500 CAPSULE ORAL 3 TIMES DAILY
Qty: 30 CAPSULE | Refills: 0 | Status: SHIPPED | OUTPATIENT
Start: 2018-12-24 | End: 2020-02-06

## 2019-01-13 DIAGNOSIS — N92.1 MENOMETRORRHAGIA: ICD-10-CM

## 2019-01-14 RX ORDER — NORETHINDRONE AND ETHINYL ESTRADIOL 1 MG-35MCG
KIT ORAL
Qty: 84 TABLET | Refills: 0 | OUTPATIENT
Start: 2019-01-14

## 2019-02-12 ENCOUNTER — MYC REFILL (OUTPATIENT)
Dept: INTERNAL MEDICINE | Facility: CLINIC | Age: 34
End: 2019-02-12

## 2019-02-12 DIAGNOSIS — E66.09 NON MORBID OBESITY DUE TO EXCESS CALORIES: ICD-10-CM

## 2019-02-12 RX ORDER — PHENTERMINE HYDROCHLORIDE 30 MG/1
30 CAPSULE ORAL EVERY MORNING
Qty: 30 CAPSULE | Refills: 0 | Status: CANCELLED | OUTPATIENT
Start: 2019-02-12

## 2019-02-14 RX ORDER — PHENTERMINE HYDROCHLORIDE 30 MG/1
30 CAPSULE ORAL EVERY MORNING
Qty: 30 CAPSULE | Refills: 1 | Status: SHIPPED | OUTPATIENT
Start: 2019-02-14 | End: 2019-04-18

## 2019-02-14 NOTE — TELEPHONE ENCOUNTER
Requested Prescriptions   Pending Prescriptions Disp Refills     phentermine (ADIPEX-P) 30 MG capsule  Last Written Prescription Date:  1/4/19  Last Fill Quantity: 30,  # refills: 0   Last office visit: 11/7/2018 with prescribing provider:  Tessie   Future Office Visit:    30 capsule 1     Sig: Take 1 capsule (30 mg) by mouth every morning    There is no refill protocol information for this order      Routing refill request to provider for review/approval because:  Drug not on the [facility] refill protocol

## 2019-04-11 ENCOUNTER — MYC MEDICAL ADVICE (OUTPATIENT)
Dept: INTERNAL MEDICINE | Facility: CLINIC | Age: 34
End: 2019-04-11

## 2019-04-11 DIAGNOSIS — L98.9 SKIN LESION: Primary | ICD-10-CM

## 2019-04-11 NOTE — TELEPHONE ENCOUNTER
Please see MyChart message below and advise.    Patient had surgery on 12/27/16 for removal of lesion from second costal cartilage.

## 2019-04-18 ENCOUNTER — MYC REFILL (OUTPATIENT)
Dept: INTERNAL MEDICINE | Facility: CLINIC | Age: 34
End: 2019-04-18

## 2019-04-18 DIAGNOSIS — E66.09 NON MORBID OBESITY DUE TO EXCESS CALORIES: ICD-10-CM

## 2019-04-19 RX ORDER — PHENTERMINE HYDROCHLORIDE 30 MG/1
30 CAPSULE ORAL EVERY MORNING
Qty: 30 CAPSULE | Refills: 1 | Status: SHIPPED | OUTPATIENT
Start: 2019-04-19 | End: 2019-07-03

## 2019-04-19 NOTE — TELEPHONE ENCOUNTER
Phentermine      Last Written Prescription Date:  2/14/19  Last Fill Quantity: 30,   # refills: 1  Last Office Visit: 11/7/18  Future Office visit:       Routing refill request to provider for review/approval because:  Drug not on the G, P or [facility] refill protocol or controlled substance

## 2019-06-03 ENCOUNTER — TELEPHONE (OUTPATIENT)
Dept: INTERNAL MEDICINE | Facility: CLINIC | Age: 34
End: 2019-06-03

## 2019-06-03 NOTE — TELEPHONE ENCOUNTER
Prior Authorization Specialty Medication Request    Medication/Dose: Phentermine HCI 30mg   ICD code (if different than what is on RX):  Unknown  Previously Tried and Failed:  Unknown    Important Lab Values: Unknown  Rationale: Unknown    Insurance Name: [employer]  Insurance ID: [id number]  Insurance Phone Number: [phone]    Pharmacy Information (if different than what is on RX)  Name:  [facility]  Phone:  [phone]    [employer]  Key: [id number]  Patient Last Name: Denny  : 1985

## 2019-06-05 NOTE — TELEPHONE ENCOUNTER
PA Initiation    Medication: Phentermine HCI 30mg  Insurance Company: [employer] - Phone [phone] Fax [phone]  Pharmacy Filling the Rx: [facility] [address] [place] [address] AT [facility] [address]  Filling Pharmacy Phone:  [phone]  Filling Pharmacy Fax:    Start Date: 6/5/2019    Central Prior Authorization Team   Phone: [phone]

## 2019-06-06 NOTE — TELEPHONE ENCOUNTER
Prior Authorization Approval    Authorization Effective Date: 6/6/2019  Authorization Expiration Date: 12/3/2019  Medication: Phentermine HCI 30mg  Approved Dose/Quantity: 30  Reference #: [id number]   Insurance Company: [email or web address] - Phone [phone] Fax [phone]  Which Pharmacy is filling the prescription (Not needed for infusion/clinic administered): [facility] [address] [place] [address] AT [facility] [address]  Pharmacy Notified: Yes  Patient Notified: **Instructed pharmacy to notify patient when script is ready to /ship.**

## 2019-07-02 ENCOUNTER — TELEPHONE (OUTPATIENT)
Dept: OBGYN | Facility: CLINIC | Age: 34
End: 2019-07-02

## 2019-07-02 DIAGNOSIS — E66.09 NON MORBID OBESITY DUE TO EXCESS CALORIES: ICD-10-CM

## 2019-07-02 NOTE — TELEPHONE ENCOUNTER
Patient currently gets med filled by [facility], would like to switch to [facility], can we fill this for her?    phentermine (ADIPEX-P) 30 MG capsule 30 capsule 1 4/19/2019  No   Sig - Route: Take 1 capsule (30 mg) by mouth every morning - Oral   Class: [employer] Print   Order: [id number]

## 2019-07-03 RX ORDER — PHENTERMINE HYDROCHLORIDE 30 MG/1
30 CAPSULE ORAL EVERY MORNING
Qty: 30 CAPSULE | Refills: 1 | Status: SHIPPED | OUTPATIENT
Start: 2019-07-03 | End: 2019-08-28

## 2019-07-03 NOTE — TELEPHONE ENCOUNTER
Sure   Please call her and let her know I like to see paitents every 3 months to make sure it is working and their blood pressures are stable.   Please help her make appointment sometime in August/September   rx needs to be faxed   Dr. Lu Burris,     Obstetrics and Gynecology  [facility] [place]

## 2019-07-22 NOTE — TELEPHONE ENCOUNTER
Phentermine      Last Written Prescription Date:  3/6/17  Last Fill Quantity: 30,   # refills: 0  Last Office Visit with [facility], P or  Health prescribing provider: 3/13/17  Future Office visit:       Routing refill request to provider for review/approval because:  Drug not on the [facility], [facility] or  Health refill protocol or controlled substance      used

## 2019-08-28 ENCOUNTER — MYC REFILL (OUTPATIENT)
Dept: OBGYN | Facility: CLINIC | Age: 34
End: 2019-08-28

## 2019-08-28 DIAGNOSIS — E66.09 NON MORBID OBESITY DUE TO EXCESS CALORIES: ICD-10-CM

## 2019-08-28 RX ORDER — PHENTERMINE HYDROCHLORIDE 30 MG/1
30 CAPSULE ORAL EVERY MORNING
Qty: 30 CAPSULE | Refills: 1 | Status: SHIPPED | OUTPATIENT
Start: 2019-08-28 | End: 2020-02-06

## 2019-09-24 ENCOUNTER — OFFICE VISIT (OUTPATIENT)
Dept: OBGYN | Facility: CLINIC | Age: 34
End: 2019-09-24
Payer: COMMERCIAL

## 2019-09-24 VITALS
SYSTOLIC BLOOD PRESSURE: 124 MMHG | DIASTOLIC BLOOD PRESSURE: 64 MMHG | WEIGHT: 142.6 LBS | HEIGHT: 61 IN | BODY MASS INDEX: 26.92 KG/M2

## 2019-09-24 DIAGNOSIS — E66.3 OVERWEIGHT (BMI 25.0-29.9): Primary | ICD-10-CM

## 2019-09-24 PROCEDURE — 99213 OFFICE O/P EST LOW 20 MIN: CPT | Performed by: FAMILY MEDICINE

## 2019-09-24 RX ORDER — PHENTERMINE HYDROCHLORIDE 37.5 MG/1
37.5 CAPSULE ORAL EVERY MORNING
Qty: 90 CAPSULE | Refills: 0 | Status: SHIPPED | OUTPATIENT
Start: 2019-09-24 | End: 2020-01-07

## 2019-09-24 ASSESSMENT — MIFFLIN-ST. JEOR: SCORE: 1298.17

## 2019-09-24 NOTE — PROGRESS NOTES
"Subjective: BV  is a 33 year old year old woman here for phentermine refill and   Follow-up.  Has been using for 2-3 years on and off.   Was treated with Dr. Kurtz for 6 months.        Objective: /64 (BP Location: Left arm, Patient Position: Sitting, Cuff Size: Adult Regular)   Ht 1.556 m (5' 1.25\")   Wt 64.7 kg (142 lb 9.6 oz)   BMI 26.72 kg/m     Starting weight 149 -->  Today's weight 142 9.6 oz  Weight loss goal:  135 to reach normal BMI     Assessment/Plan:  1) Weight loss: refill for 90 days  Patient notes when off she will gain weight back.  Will gain 5-10 pounds.    So we need to have a maintenance plan.      She is doing 1200 calories with 1 hour of exercise  Macro 60 g protein, carb < 150 g, fiber 25 g, fat < 40 g    Intermittent fasting: has tried 24 hours fasts   And has tried 16/8 fasting but hasn't helped significantly     Advise her to try decreasing carb to 100 g, increasing protein and fat     Carb cycling could also be done and she would then have  5 low-carb days  (<100 g carb) and 2 high carb (150 g)    Also plan to reduce to 15 mg phentermine to maintain x 3-6 months  After she reaches her goal weight  Then every other day    2) Return in 3 months     15 minutes spent with the patient with greater than 50% of the time spent in counseling the patient.         The information in this document, created by the medical scribe for me, accurately reflects the services I personally performed and the decisions made by me. I have reviewed and approved this document for accuracy prior to leaving the patient care area.  Lu Burris DO  4:13 PM, 01/10/2017    Dr. Lu Burris, DO     OB/GYN    [facility]     "

## 2019-09-24 NOTE — PATIENT INSTRUCTIONS
Return in 3 months   Macro 60 g protein, carb < 150 g, fiber 25 g, fat < 40 g    Intermittent fasting: has tried 24 hours fasts   And has tried 16/8 fasting but hasn't     look into decreasing carb to 100 g, increasing protein and fat     Carb cycling could be done   5 low-carb days  (<100 g) and 2 high carb 150 b    Also plan to reduce to 15 mg phentermine to maintain x 3-6 months  Then every other day    Dr. Lu Burris, DO    Obstetrics and Gynecology  [facility]

## 2019-11-04 DIAGNOSIS — Z00.00 ENCOUNTER FOR PREVENTATIVE ADULT HEALTH CARE EXAMINATION: ICD-10-CM

## 2019-11-04 LAB
ALBUMIN SERPL-MCNC: 3.8 G/DL (ref 3.4–5)
ALP SERPL-CCNC: 37 U/L (ref 40–150)
ALT SERPL W P-5'-P-CCNC: 31 U/L (ref 0–50)
ANION GAP SERPL CALCULATED.3IONS-SCNC: 10 MMOL/L (ref 3–14)
AST SERPL W P-5'-P-CCNC: 18 U/L (ref 0–45)
BILIRUB SERPL-MCNC: 0.6 MG/DL (ref 0.2–1.3)
BUN SERPL-MCNC: 12 MG/DL (ref 7–30)
CALCIUM SERPL-MCNC: 8.7 MG/DL (ref 8.5–10.1)
CHLORIDE SERPL-SCNC: 107 MMOL/L (ref 94–109)
CHOLEST SERPL-MCNC: 176 MG/DL
CO2 SERPL-SCNC: 21 MMOL/L (ref 20–32)
CREAT SERPL-MCNC: 0.8 MG/DL (ref 0.52–1.04)
ERYTHROCYTE [DISTWIDTH] IN BLOOD BY AUTOMATED COUNT: 13 % (ref 10–15)
GFR SERPL CREATININE-BSD FRML MDRD: >90 ML/MIN/{1.73_M2}
GLUCOSE SERPL-MCNC: 90 MG/DL (ref 70–99)
HCT VFR BLD AUTO: 41.6 % (ref 35–47)
HDLC SERPL-MCNC: 50 MG/DL
HGB BLD-MCNC: 13.6 G/DL (ref 11.7–15.7)
LDLC SERPL CALC-MCNC: 102 MG/DL
MCH RBC QN AUTO: 29.3 PG (ref 26.5–33)
MCHC RBC AUTO-ENTMCNC: 32.7 G/DL (ref 31.5–36.5)
MCV RBC AUTO: 90 FL (ref 78–100)
NONHDLC SERPL-MCNC: 126 MG/DL
PLATELET # BLD AUTO: 304 10E9/L (ref 150–450)
POTASSIUM SERPL-SCNC: 3.9 MMOL/L (ref 3.4–5.3)
PROT SERPL-MCNC: 7.3 G/DL (ref 6.8–8.8)
RBC # BLD AUTO: 4.64 10E12/L (ref 3.8–5.2)
SODIUM SERPL-SCNC: 138 MMOL/L (ref 133–144)
TRIGL SERPL-MCNC: 119 MG/DL
TSH SERPL DL<=0.005 MIU/L-ACNC: 0.98 MU/L (ref 0.4–4)
WBC # BLD AUTO: 12.1 10E9/L (ref 4–11)

## 2019-11-04 PROCEDURE — 80061 LIPID PANEL: CPT | Performed by: FAMILY MEDICINE

## 2019-11-04 PROCEDURE — 84443 ASSAY THYROID STIM HORMONE: CPT | Performed by: FAMILY MEDICINE

## 2019-11-04 PROCEDURE — 36415 COLL VENOUS BLD VENIPUNCTURE: CPT | Performed by: FAMILY MEDICINE

## 2019-11-04 PROCEDURE — 85027 COMPLETE CBC AUTOMATED: CPT | Performed by: FAMILY MEDICINE

## 2019-11-04 PROCEDURE — 80053 COMPREHEN METABOLIC PANEL: CPT | Performed by: FAMILY MEDICINE

## 2019-11-19 ENCOUNTER — OFFICE VISIT (OUTPATIENT)
Dept: OBGYN | Facility: CLINIC | Age: 34
End: 2019-11-19
Payer: COMMERCIAL

## 2019-11-19 VITALS
DIASTOLIC BLOOD PRESSURE: 80 MMHG | HEIGHT: 61 IN | WEIGHT: 141.2 LBS | SYSTOLIC BLOOD PRESSURE: 128 MMHG | BODY MASS INDEX: 26.66 KG/M2

## 2019-11-19 DIAGNOSIS — Z00.00 ENCOUNTER FOR PREVENTATIVE ADULT HEALTH CARE EXAMINATION: Primary | ICD-10-CM

## 2019-11-19 PROCEDURE — 99395 PREV VISIT EST AGE 18-39: CPT | Performed by: FAMILY MEDICINE

## 2019-11-19 ASSESSMENT — MIFFLIN-ST. JEOR: SCORE: 1286.82

## 2019-11-19 NOTE — NURSING NOTE
"Chief Complaint   Patient presents with     Gyn Exam       Initial /80   Ht 1.556 m (5' .25\")   Wt 64 kg (141 lb 3.2 oz)   LMP 2019 (Exact Date)   BMI 26.46 kg/m   Estimated body mass index is 26.46 kg/m  as calculated from the following:    Height as of this encounter: 1.556 m (5' .25\").    Weight as of this encounter: 64 kg (141 lb 3.2 oz).  BP completed using cuff size: regular    Questioned patient about current smoking habits.  Pt. quit smoking some time ago.          The following HM Due: NONE           "
Yes

## 2019-11-19 NOTE — PATIENT INSTRUCTIONS
Return yearly   Dr. Lu Burris, DO    Obstetrics and Gynecology  [facility]

## 2019-11-19 NOTE — PROGRESS NOTES
SUBJECTIVE:  Dinorah Baldwin is an 34 year old  woman who presents for   annual gyn exam. Patient's last menstrual period was 2019 (exact date). Periods are regular q 28-30 days, lasting   5-8 days. Dysmenorrhea:none. Cyclic symptoms   include none. No intermenstrual bleeding,   spotting, or discharge.  Menarche age teenage.  STD hx: none.    Current contraception: Nexplanon   SAMUEL exposure: no  History of abnormal Pap smear: No  Family history of uterine or ovarian cancer: No  Regular self breast exam: Yes  History of abnormal mammogram: No  Family history of breast cancer: No  History of abnormal lipids: No    - Pt also takes OCP to alleviate abnormal spotting between menses.     - Pt on Phentermine for medical weight loss treatment. Her starting weight was 149. Today her weight is 141. Her goal is 130-135.     Past Medical History:   Diagnosis Date     ALLERGIC RHINITIS      Closed fracture of unspecified part of humerus     Left     DEPRESSIVE DISORDER      Depressive disorder     Have had dx of depression multiple times simce adolescence     Tobacco use disorder      Unspecified closed fracture of ankle     Ankle fracture        Family History   Problem Relation Age of Onset     Cerebrovascular Disease Father      Alcohol/Drug Father      Depression Father      Lipids Father      Alcohol/Drug Brother      Depression Brother      Arthritis Mother      Depression Mother      Depression Sister      Alcohol/Drug Brother      Hypertension Paternal Grandmother      Alzheimer Disease Maternal Grandmother      Colon Cancer Other      Breast Cancer No family hx of      Colon Cancer No family hx of        Past Surgical History:   Procedure Laterality Date     ENT SURGERY      tonsills     EXCISE LESION TRUNK N/A 2016    Procedure: EXCISE LESION TRUNK;  Surgeon: Kris Younger MD;  Location: [facility] OR       Current Outpatient Medications   Medication     Etonogestrel (NEXPLANON SC)  "    LORazepam (ATIVAN) 0.5 MG tablet     Multiple Vitamins-Minerals (MULTI ADULT GUMMIES PO)     norethindrone-ethinyl estradiol (ORTHO-NOVUM 1-35 TAB,NORTREL 1-35 TAB) 1-35 MG-MCG per tablet     phentermine (ADIPEX-P) 30 MG capsule     amoxicillin (AMOXIL) 500 MG capsule     buPROPion (WELLBUTRIN SR) 150 MG 12 hr tablet     phentermine (ADIPEX-P) 37.5 MG capsule     No current facility-administered medications for this visit.      Allergies   Allergen Reactions     Zyrtec [Cetirizine Hcl] Rash       Social History     Tobacco Use     Smoking status: Former Smoker     Years: 1.00     Last attempt to quit: 10/22/2011     Years since quittin.0     Smokeless tobacco: Never Used     Tobacco comment: 1 pack every 2.5 days   Substance Use Topics     Alcohol use: Yes     Comment: Limited use 2x s mnth glass of wine or 2       Review Of Systems  Ears/Nose/Throat: negative  Respiratory: No shortness of breath, dyspnea on exertion, cough, or hemoptysis  Cardiovascular: negative  Gastrointestinal: negative  Genitourinary: negative  Constitutional, HEENT, cardiovascular, pulmonary, GI, , musculoskeletal, neuro, skin, endocrine and psych systems are negative, except as otherwise noted.    This document serves as a record of the services and decisions personally performed and made by Lu Burris DO. It was created on her behalf by Nhi Hameed, a trained medical scribe. The creation of this document is based on the provider's statements to the medical scribe.  Nhi Hameed 9:12 AM 2019    OBJECTIVE:  /80   Ht 1.556 m (5' 1.25\")   Wt 64 kg (141 lb 3.2 oz)   LMP 2019 (Exact Date)   BMI 26.46 kg/m      General appearance: healthy, alert and no distress  Skin: Skin color, texture, turgor normal. No rashes or lesions.  Ears: negative  Nose/Sinuses: Nares normal. Septum midline. Mucosa normal. No drainage or sinus tenderness.  Oropharynx: Lips, mucosa, and tongue normal. Teeth and gums " normal.  Neck: Neck supple. No adenopathy. Thyroid symmetric, normal size,, Carotids without bruits.  Lungs: negative, Percussion normal. Good diaphragmatic excursion. Lungs clear  Heart: negative, PMI normal. No lifts, heaves, or thrills. RRR. No murmurs, clicks gallops or rub  Breasts: Inspection negative. No nipple discharge or bleeding. No masses.  Abdomen: Abdomen soft, non-tender. BS normal. No masses, organomegaly  Pelvic: Pelvic:  Pelvic examination without pap/ Gonorrhea and Chlamydia   including  External genitalia normal   and vagina normal rugatted not atrophic  Examination of urethra  normal no masses, tenderness, scarring  bladder, no masses or tenderness  Cervix no lesions or discharge  Bimanual exam with   Uterus 6 weeks size, mid position, mobile, no tenderness, no descent   Adnexa/parametria  Normal, no masses       ASSESSMENT:  Dinorah Baldwin is an 34 year old  woman who presents for   annual gyn exam. Concerns:  None Today     PLAN:  Dx: Annual Physical Exam   1)  Pap smear not due. No refills needed.   2)  Return: Yearly or as needed     Rx: None     The information in this document, created by the medical scribe for me, accurately reflects the services I personally performed and the decisions made by me. I have reviewed and approved this document for accuracy prior to leaving the patient care area.  2019 9:16 AM    PE:  Reviewed health maintenance including diet, regular exercise   and periodic exams.    Dr. Lu Burris, DO    Obstetrics and Gynecology  [facility] [place]

## 2020-01-06 DIAGNOSIS — E66.3 OVERWEIGHT (BMI 25.0-29.9): ICD-10-CM

## 2020-01-06 DIAGNOSIS — N92.1 MENOMETRORRHAGIA: ICD-10-CM

## 2020-01-06 DIAGNOSIS — Z00.00 ENCOUNTER FOR PREVENTATIVE ADULT HEALTH CARE EXAMINATION: ICD-10-CM

## 2020-01-06 NOTE — TELEPHONE ENCOUNTER
Requested Prescriptions   Signed Prescriptions Disp Refills    norethindrone-ethinyl estradiol (ORTHO-NOVUM 1-35 TAB,NORTREL 1-35 TAB) 1-35 MG-MCG tablet 90 tablet 3     Sig: Take 1 tablet by mouth daily       There is no refill protocol information for this order        Prescription approved per [facility] Refill Protocol.  Susan STERLING RN

## 2020-01-07 RX ORDER — PHENTERMINE HYDROCHLORIDE 37.5 MG/1
CAPSULE ORAL
Qty: 90 CAPSULE | Refills: 3 | Status: SHIPPED | OUTPATIENT
Start: 2020-01-07 | End: 2020-05-27

## 2020-02-06 ENCOUNTER — OFFICE VISIT (OUTPATIENT)
Dept: INTERNAL MEDICINE | Facility: CLINIC | Age: 35
End: 2020-02-06
Payer: COMMERCIAL

## 2020-02-06 VITALS
TEMPERATURE: 97.9 F | RESPIRATION RATE: 18 BRPM | OXYGEN SATURATION: 100 % | HEIGHT: 61 IN | WEIGHT: 141.8 LBS | HEART RATE: 90 BPM | BODY MASS INDEX: 26.77 KG/M2 | DIASTOLIC BLOOD PRESSURE: 90 MMHG | SYSTOLIC BLOOD PRESSURE: 150 MMHG

## 2020-02-06 DIAGNOSIS — J20.9 ACUTE BRONCHITIS, UNSPECIFIED ORGANISM: Primary | ICD-10-CM

## 2020-02-06 DIAGNOSIS — R03.0 ELEVATED BLOOD PRESSURE READING WITHOUT DIAGNOSIS OF HYPERTENSION: ICD-10-CM

## 2020-02-06 PROCEDURE — 99214 OFFICE O/P EST MOD 30 MIN: CPT | Performed by: INTERNAL MEDICINE

## 2020-02-06 ASSESSMENT — ENCOUNTER SYMPTOMS
WHEEZING: 0
CHILLS: 0
SINUS PRESSURE: 1
COUGH: 1
SORE THROAT: 1
SINUS PAIN: 0
SHORTNESS OF BREATH: 0
CHEST TIGHTNESS: 1
FEVER: 0

## 2020-02-06 ASSESSMENT — MIFFLIN-ST. JEOR: SCORE: 1289.54

## 2020-02-06 ASSESSMENT — PATIENT HEALTH QUESTIONNAIRE - PHQ9
SUM OF ALL RESPONSES TO PHQ QUESTIONS 1-9: 7
10. IF YOU CHECKED OFF ANY PROBLEMS, HOW DIFFICULT HAVE THESE PROBLEMS MADE IT FOR YOU TO DO YOUR WORK, TAKE CARE OF THINGS AT HOME, OR GET ALONG WITH OTHER PEOPLE: SOMEWHAT DIFFICULT
SUM OF ALL RESPONSES TO PHQ QUESTIONS 1-9: 7

## 2020-02-06 NOTE — PROGRESS NOTES
Subjective     Dinorah Baldwin is a 34 year old female who presents to clinic today for the following health issues:    HPI   Patient is here with 1 week history of sore throat which has been gradually worsening.  Patient also noticed that for the last 4 days for sore throat is extending and she is having chest congestion burning type of discomfort in the chest region.  She is coughing up green sputum.  Denies fever, chills.  Denies ear pain.  Has mild sinus discomfort.  Denies smoking or asthma history.    Her blood pressure was elevated and repeat blood pressure was around 150 systolic.  Her heart rate was around 90.  Patient has been taking phentermine to help with weight loss.  Patient has been taking it off and on for last few years.  Patient said her blood pressure is usually in normal range.  Denies family history of hypertension.    Patient Active Problem List   Diagnosis     DEPRESSIVE DISORDER     Non morbid obesity due to excess calories     Right-sided chest wall pain     Past Surgical History:   Procedure Laterality Date     ENT SURGERY      tonsills     EXCISE LESION TRUNK N/A 2016    Procedure: EXCISE LESION TRUNK;  Surgeon: Kris Younger MD;  Location:  OR       Social History     Tobacco Use     Smoking status: Former Smoker     Years: 1.00     Last attempt to quit: 10/22/2011     Years since quittin.2     Smokeless tobacco: Never Used     Tobacco comment: 1 pack every 2.5 days   Substance Use Topics     Alcohol use: Yes     Comment: Limited use 2x s mnth glass of wine or 2     Family History   Problem Relation Age of Onset     Cerebrovascular Disease Father      Alcohol/Drug Father      Depression Father      Lipids Father      Alcohol/Drug Brother      Depression Brother      Arthritis Mother      Depression Mother      Depression Sister      Alcohol/Drug Brother      No Known Problems Daughter      Hypertension Paternal Grandmother      Alzheimer Disease Maternal  "Grandmother      Colon Cancer Other      Breast Cancer No family hx of          Current Outpatient Medications   Medication Sig Dispense Refill     amoxicillin-clavulanate (AUGMENTIN) 875-125 MG tablet Take 1 tablet by mouth 2 times daily for 7 days 14 tablet 0     Etonogestrel (NEXPLANON SC) by IMPLANT route continuous        LORazepam (ATIVAN) 0.5 MG tablet Take 0.5 mg by mouth as needed for anxiety       Multiple Vitamins-Minerals (MULTI ADULT GUMMIES PO) Take 1 chew tab by mouth three times a week       norethindrone-ethinyl estradiol (ORTHO-NOVUM 1-35 TAB,NORTREL 1-35 TAB) 1-35 MG-MCG tablet Take 1 tablet by mouth daily 90 tablet 3     phentermine (ADIPEX-P) 37.5 MG capsule TAKE 1 CAPSULE BY MOUTH EVERY MORNING 90 capsule 3     Allergies   Allergen Reactions     Zyrtec [Cetirizine Hcl] Rash       Reviewed and updated as needed this visit by Provider  Med Hx         Review of Systems   Constitutional: Negative for chills and fever.   HENT: Positive for congestion, sinus pressure and sore throat. Negative for ear discharge, ear pain and sinus pain.    Respiratory: Positive for cough and chest tightness. Negative for shortness of breath and wheezing.          Objective    BP (!) 150/90   Pulse 90   Temp 97.9  F (36.6  C) (Oral)   Resp 18   Ht 1.556 m (5' 1.25\")   Wt 64.3 kg (141 lb 12.8 oz)   LMP 01/25/2020 (Exact Date)   SpO2 100%   Breastfeeding No   BMI 26.57 kg/m    Body mass index is 26.57 kg/m .  Physical Exam  Constitutional:       Appearance: Normal appearance.   HENT:      Head: Normocephalic and atraumatic.      Mouth/Throat:      Mouth: Mucous membranes are moist.      Pharynx: Posterior oropharyngeal erythema present.   Neck:      Musculoskeletal: Neck supple.   Cardiovascular:      Rate and Rhythm: Normal rate and regular rhythm.      Pulses: Normal pulses.   Pulmonary:      Effort: Pulmonary effort is normal.      Breath sounds: Normal breath sounds. No wheezing.   Lymphadenopathy:      " Cervical: No cervical adenopathy.   Neurological:      General: No focal deficit present.      Mental Status: She is alert.   Psychiatric:         Mood and Affect: Mood normal.            Assessment & Plan     Dinorah was seen today for uri.    Diagnoses and all orders for this visit:    Acute bronchitis, unspecified organism  -     amoxicillin-clavulanate (AUGMENTIN) 875-125 MG tablet; Take 1 tablet by mouth 2 times daily for 7 days    Elevated blood pressure reading without diagnosis of hypertension      Will give Augmentin for infection.  Advised the patient to start checking the blood pressure couple of times a week and if it is persistently high we may have to stop her phentermine as it might make her blood pressure and heart rate go up.  We will send a message to patient primary care about her blood pressure.    Follow-up: 1 month with primary care if blood pressure is running high.    Gaston Mosher MD  [facility]

## 2020-02-06 NOTE — NURSING NOTE
"BP (!) 148/106 (BP Location: Right arm, Patient Position: Sitting, Cuff Size: Adult Regular)   Pulse 90   Temp 97.9  F (36.6  C) (Oral)   Resp 18   Ht 1.556 m (5' 1.25\")   Wt 64.3 kg (141 lb 12.8 oz)   LMP 01/25/2020 (Exact Date)   SpO2 100%   Breastfeeding No   BMI 26.57 kg/m    Ana Steele CMA    "

## 2020-05-27 ENCOUNTER — MYC REFILL (OUTPATIENT)
Dept: OBGYN | Facility: CLINIC | Age: 35
End: 2020-05-27

## 2020-05-27 DIAGNOSIS — E66.3 OVERWEIGHT (BMI 25.0-29.9): ICD-10-CM

## 2020-05-27 RX ORDER — PHENTERMINE HYDROCHLORIDE 37.5 MG/1
37.5 CAPSULE ORAL EVERY MORNING
Qty: 90 CAPSULE | Refills: 3 | Status: SHIPPED | OUTPATIENT
Start: 2020-05-27 | End: 2020-12-01

## 2020-05-27 NOTE — TELEPHONE ENCOUNTER
Routing refill request to provider for review/approval because:  Drug not on the [facility] refill protocol   Sachi Ayon RN

## 2020-07-17 ENCOUNTER — ALLIED HEALTH/NURSE VISIT (OUTPATIENT)
Dept: NURSING | Facility: CLINIC | Age: 35
End: 2020-07-17
Payer: COMMERCIAL

## 2020-07-17 ENCOUNTER — TELEPHONE (OUTPATIENT)
Dept: INTERNAL MEDICINE | Facility: CLINIC | Age: 35
End: 2020-07-17

## 2020-07-17 DIAGNOSIS — J02.9 SORE THROAT: Primary | ICD-10-CM

## 2020-07-17 LAB
DEPRECATED S PYO AG THROAT QL EIA: NEGATIVE
SPECIMEN SOURCE: NORMAL
SPECIMEN SOURCE: NORMAL
STREP GROUP A PCR: NOT DETECTED

## 2020-07-17 PROCEDURE — 87651 STREP A DNA AMP PROBE: CPT | Performed by: INTERNAL MEDICINE

## 2020-07-17 PROCEDURE — 40001204 ZZHCL STATISTIC STREP A RAPID: Performed by: INTERNAL MEDICINE

## 2020-07-17 NOTE — PROGRESS NOTES
Letter given to pt from PCP to excuse her from work today. COVID ordered by PCP as well. Son has strep currently.

## 2020-07-17 NOTE — LETTER
To whom it may concern:    Dinorah Baldwin is a patient under my care.  Please excuse her from work on 7/17/2020 secondary to a medical illness.    Please call with any questions.      Moraima Kurtz MD

## 2020-11-24 ENCOUNTER — OFFICE VISIT (OUTPATIENT)
Dept: OBGYN | Facility: CLINIC | Age: 35
End: 2020-11-24
Payer: COMMERCIAL

## 2020-11-24 VITALS
WEIGHT: 153.2 LBS | BODY MASS INDEX: 28.92 KG/M2 | DIASTOLIC BLOOD PRESSURE: 94 MMHG | SYSTOLIC BLOOD PRESSURE: 142 MMHG | HEIGHT: 61 IN

## 2020-11-24 DIAGNOSIS — Z00.00 ENCOUNTER FOR PREVENTATIVE ADULT HEALTH CARE EXAMINATION: ICD-10-CM

## 2020-11-24 DIAGNOSIS — Z30.41 ENCOUNTER FOR SURVEILLANCE OF CONTRACEPTIVE PILLS: Primary | ICD-10-CM

## 2020-11-24 DIAGNOSIS — N92.1 MENOMETRORRHAGIA: ICD-10-CM

## 2020-11-24 DIAGNOSIS — D72.829 LEUKOCYTOSIS, UNSPECIFIED TYPE: ICD-10-CM

## 2020-11-24 LAB
ALBUMIN SERPL-MCNC: 3.6 G/DL (ref 3.4–5)
ALP SERPL-CCNC: 47 U/L (ref 40–150)
ALT SERPL W P-5'-P-CCNC: 28 U/L (ref 0–50)
ANION GAP SERPL CALCULATED.3IONS-SCNC: 6 MMOL/L (ref 3–14)
AST SERPL W P-5'-P-CCNC: 17 U/L (ref 0–45)
BILIRUB SERPL-MCNC: 0.6 MG/DL (ref 0.2–1.3)
BUN SERPL-MCNC: 14 MG/DL (ref 7–30)
CALCIUM SERPL-MCNC: 9 MG/DL (ref 8.5–10.1)
CHLORIDE SERPL-SCNC: 105 MMOL/L (ref 94–109)
CHOLEST SERPL-MCNC: 205 MG/DL
CO2 SERPL-SCNC: 25 MMOL/L (ref 20–32)
CREAT SERPL-MCNC: 0.69 MG/DL (ref 0.52–1.04)
ERYTHROCYTE [DISTWIDTH] IN BLOOD BY AUTOMATED COUNT: 12.6 % (ref 10–15)
GFR SERPL CREATININE-BSD FRML MDRD: >90 ML/MIN/{1.73_M2}
GLUCOSE SERPL-MCNC: 90 MG/DL (ref 70–99)
HCT VFR BLD AUTO: 43.5 % (ref 35–47)
HDLC SERPL-MCNC: 50 MG/DL
HGB BLD-MCNC: 14.3 G/DL (ref 11.7–15.7)
LDLC SERPL CALC-MCNC: 131 MG/DL
MCH RBC QN AUTO: 29.2 PG (ref 26.5–33)
MCHC RBC AUTO-ENTMCNC: 32.9 G/DL (ref 31.5–36.5)
MCV RBC AUTO: 89 FL (ref 78–100)
NONHDLC SERPL-MCNC: 155 MG/DL
PLATELET # BLD AUTO: 281 10E9/L (ref 150–450)
POTASSIUM SERPL-SCNC: 4.2 MMOL/L (ref 3.4–5.3)
PROT SERPL-MCNC: 7.3 G/DL (ref 6.8–8.8)
RBC # BLD AUTO: 4.9 10E12/L (ref 3.8–5.2)
SODIUM SERPL-SCNC: 136 MMOL/L (ref 133–144)
TRIGL SERPL-MCNC: 118 MG/DL
TSH SERPL DL<=0.005 MIU/L-ACNC: 1.11 MU/L (ref 0.4–4)
WBC # BLD AUTO: 11.2 10E9/L (ref 4–11)

## 2020-11-24 PROCEDURE — 80061 LIPID PANEL: CPT | Performed by: FAMILY MEDICINE

## 2020-11-24 PROCEDURE — 36415 COLL VENOUS BLD VENIPUNCTURE: CPT | Performed by: FAMILY MEDICINE

## 2020-11-24 PROCEDURE — 85027 COMPLETE CBC AUTOMATED: CPT | Performed by: FAMILY MEDICINE

## 2020-11-24 PROCEDURE — 80053 COMPREHEN METABOLIC PANEL: CPT | Performed by: FAMILY MEDICINE

## 2020-11-24 PROCEDURE — 99395 PREV VISIT EST AGE 18-39: CPT | Performed by: FAMILY MEDICINE

## 2020-11-24 PROCEDURE — 84443 ASSAY THYROID STIM HORMONE: CPT | Performed by: FAMILY MEDICINE

## 2020-11-24 ASSESSMENT — MIFFLIN-ST. JEOR: SCORE: 1336.25

## 2020-11-24 NOTE — PROGRESS NOTES
SUBJECTIVE:  Dinorah Romero is an 35 year old  woman who presents for   annual gyn exam. No LMP recorded. Periods are 28 days, lasting   5-6 days. Dysmenorrhea:none. Cyclic symptoms   include none. No intermenstrual bleeding,   spotting, or discharge.  Menarche age teenager.  STD hx: none.    Current contraception: oral contraceptives and nexplanon  SAMUEL exposure: no  History of abnormal Pap smear: No  Family history of uterine or ovarian cancer: No  Regular self breast exam: Yes  History of abnormal mammogram: No  Family history of breast cancer: No  History of abnormal lipids: No    Past Medical History:   Diagnosis Date     ALLERGIC RHINITIS      Closed fracture of unspecified part of humerus     Left     DEPRESSIVE DISORDER      Depressive disorder     Have had dx of depression multiple times simce adolescence     Tobacco use disorder      Unspecified closed fracture of ankle     Ankle fracture        Family History   Problem Relation Age of Onset     Cerebrovascular Disease Father      Alcohol/Drug Father      Depression Father      Lipids Father      Alcohol/Drug Brother      Depression Brother      Arthritis Mother      Depression Mother      Depression Sister      Alcohol/Drug Brother      No Known Problems Daughter      Hypertension Paternal Grandmother      Alzheimer Disease Maternal Grandmother      Colon Cancer Other      Breast Cancer No family hx of        Past Surgical History:   Procedure Laterality Date     ENT SURGERY      tonsills     EXCISE LESION TRUNK N/A 2016    Procedure: EXCISE LESION TRUNK;  Surgeon: Kris Younger MD;  Location: [facility] OR       Current Outpatient Medications   Medication     LORazepam (ATIVAN) 0.5 MG tablet     Multiple Vitamins-Minerals (MULTI ADULT GUMMIES PO)     norethindrone-ethinyl estradiol (ORTHO-NOVUM 1-35 TAB,NORTREL 1-35 TAB) 1-35 MG-MCG tablet     phentermine (ADIPEX-P) 37.5 MG capsule     Etonogestrel (NEXPLANON SC)     No  "current facility-administered medications for this visit.      Allergies   Allergen Reactions     Zyrtec [Cetirizine Hcl] Rash       Social History     Tobacco Use     Smoking status: Former Smoker     Years: 1.00     Quit date: 10/22/2011     Years since quittin.0     Smokeless tobacco: Never Used     Tobacco comment: 1 pack every 2.5 days   Substance Use Topics     Alcohol use: Yes     Comment: Limited use 2x s mnth glass of wine or 2       Review Of Systems  Ears/Nose/Throat: negative  Respiratory: No shortness of breath, dyspnea on exertion, cough, or hemoptysis  Cardiovascular: negative  Gastrointestinal: negative  Genitourinary: negative  Constitutional, HEENT, cardiovascular, pulmonary, GI, , musculoskeletal, neuro, skin, endocrine and psych systems are negative, except as otherwise noted.      OBJECTIVE:  BP (!) 142/94   Ht 1.556 m (5' 1.25\")   Wt 69.5 kg (153 lb 3.2 oz)   BMI 28.71 kg/m      General appearance: healthy, alert and no distress  Skin: Skin color, texture, turgor normal. No rashes or lesions.  Ears: negative  Nose/Sinuses: Nares normal. Septum midline. Mucosa normal. No drainage or sinus tenderness.  Oropharynx: Lips, mucosa, and tongue normal. Teeth and gums normal.  Neck: Neck supple. No adenopathy. Thyroid symmetric, normal size,, Carotids without bruits.  Lungs: negative, Percussion normal. Good diaphragmatic excursion. Lungs clear  Heart: negative, PMI normal. No lifts, heaves, or thrills. RRR. No murmurs, clicks gallops or rub  Breasts: Inspection negative. No nipple discharge or bleeding. No masses.  Abdomen: Abdomen soft, non-tender. BS normal. No masses, organomegaly  Pelvic: Pelvic:  Pelvic examination with no pap/no Gonorrhea and Chlamydia   including  External genitalia normal   and vagina normal rugatted not atrophic  Examination of urethra  normal no masses, tenderness, scarring  bladder, no masses or tenderness  Cervix no lesions or discharge  Bimanual exam with   Uterus " 7 weeks size, mid position, mobile,non-tenderness, no descent   Adnexa/parametria  Normal no masses     ASSESSMENT:  Dinorah Romero is an 35 year old  woman who presents for   annual gyn exam. Concerns:   None     PLAN:  Dx:  1)  Pap smear  2)  Mammography, lipids at appropriate intervals  3)  Refill Oral Contraceptive, nexplanon due replaced 2021    PE:  Reviewed health maintenance including diet, regular exercise   and periodic exams.    Dr. Lu Burris, DO    Obstetrics and Gynecology  [facility]

## 2020-11-24 NOTE — NURSING NOTE
"Chief Complaint   Patient presents with     Gyn Exam     pt is fasting for blood work--needs refill on birth control and phentermine--has nexplanon and taking OCP as well--discuss taking pill continuously       Initial BP (!) 142/94   Ht 1.556 m (5' 1.25\")   Wt 69.5 kg (153 lb 3.2 oz)   BMI 28.71 kg/m   Estimated body mass index is 28.71 kg/m  as calculated from the following:    Height as of this encounter: 1.556 m (5' .25\").    Weight as of this encounter: 69.5 kg (153 lb 3.2 oz).  BP completed using cuff size: regular    Questioned patient about current smoking habits.  Pt. quit smoking some time ago.          The following HM Due: NONE      "

## 2020-11-24 NOTE — PATIENT INSTRUCTIONS
Return yearly     Labs today       Dr. Lu Burris, DO    Obstetrics and Gynecology  [facility] [place]

## 2020-11-30 DIAGNOSIS — E66.3 OVERWEIGHT (BMI 25.0-29.9): ICD-10-CM

## 2020-11-30 NOTE — TELEPHONE ENCOUNTER
Phentermine      Last Written Prescription Date:  5/27/20  Last Fill Quantity: 90,   # refills: 3  Last Office Visit: 11/24/20  Future Office visit:       Routing refill request to provider for review/approval because:  Controlled Substance.    Susan STERLING RN

## 2020-12-01 RX ORDER — PHENTERMINE HYDROCHLORIDE 37.5 MG/1
37.5 CAPSULE ORAL EVERY MORNING
Qty: 90 CAPSULE | Refills: 3 | Status: SHIPPED | OUTPATIENT
Start: 2020-12-01 | End: 2021-10-26

## 2021-01-15 ENCOUNTER — HEALTH MAINTENANCE LETTER (OUTPATIENT)
Age: 36
End: 2021-01-15

## 2021-09-28 ENCOUNTER — DOCUMENTATION ONLY (OUTPATIENT)
Dept: OBGYN | Facility: CLINIC | Age: 36
End: 2021-09-28

## 2021-10-26 ENCOUNTER — OFFICE VISIT (OUTPATIENT)
Dept: INTERNAL MEDICINE | Facility: CLINIC | Age: 36
End: 2021-10-26
Payer: COMMERCIAL

## 2021-10-26 VITALS
RESPIRATION RATE: 14 BRPM | SYSTOLIC BLOOD PRESSURE: 144 MMHG | WEIGHT: 162 LBS | BODY MASS INDEX: 30.58 KG/M2 | HEART RATE: 108 BPM | DIASTOLIC BLOOD PRESSURE: 82 MMHG | OXYGEN SATURATION: 99 % | TEMPERATURE: 97.6 F | HEIGHT: 61 IN

## 2021-10-26 DIAGNOSIS — R03.0 ELEVATED BLOOD PRESSURE READING WITHOUT DIAGNOSIS OF HYPERTENSION: Primary | ICD-10-CM

## 2021-10-26 DIAGNOSIS — F41.9 ANXIETY: ICD-10-CM

## 2021-10-26 PROCEDURE — 99213 OFFICE O/P EST LOW 20 MIN: CPT | Performed by: FAMILY MEDICINE

## 2021-10-26 RX ORDER — LORAZEPAM 0.5 MG/1
TABLET ORAL
Qty: 20 TABLET | Refills: 0 | Status: SHIPPED | OUTPATIENT
Start: 2021-10-26

## 2021-10-26 RX ORDER — METOPROLOL SUCCINATE 25 MG/1
25 TABLET, EXTENDED RELEASE ORAL DAILY
Qty: 30 TABLET | Refills: 5 | Status: SHIPPED | OUTPATIENT
Start: 2021-10-26 | End: 2022-05-08

## 2021-10-26 ASSESSMENT — ANXIETY QUESTIONNAIRES
3. WORRYING TOO MUCH ABOUT DIFFERENT THINGS: SEVERAL DAYS
7. FEELING AFRAID AS IF SOMETHING AWFUL MIGHT HAPPEN: NOT AT ALL
2. NOT BEING ABLE TO STOP OR CONTROL WORRYING: MORE THAN HALF THE DAYS
5. BEING SO RESTLESS THAT IT IS HARD TO SIT STILL: SEVERAL DAYS
GAD7 TOTAL SCORE: 11
IF YOU CHECKED OFF ANY PROBLEMS ON THIS QUESTIONNAIRE, HOW DIFFICULT HAVE THESE PROBLEMS MADE IT FOR YOU TO DO YOUR WORK, TAKE CARE OF THINGS AT HOME, OR GET ALONG WITH OTHER PEOPLE: SOMEWHAT DIFFICULT
6. BECOMING EASILY ANNOYED OR IRRITABLE: MORE THAN HALF THE DAYS
1. FEELING NERVOUS, ANXIOUS, OR ON EDGE: MORE THAN HALF THE DAYS

## 2021-10-26 ASSESSMENT — PATIENT HEALTH QUESTIONNAIRE - PHQ9
SUM OF ALL RESPONSES TO PHQ QUESTIONS 1-9: 7
5. POOR APPETITE OR OVEREATING: NEARLY EVERY DAY

## 2021-10-26 ASSESSMENT — MIFFLIN-ST. JEOR: SCORE: 1367.21

## 2021-10-26 NOTE — PROGRESS NOTES
"  (R03.0) Elevated blood pressure reading without diagnosis of hypertension  (primary encounter diagnosis)  Comment:   Plan: metoprolol succinate ER (TOPROL-XL) 25 MG 24 hr        tablet            (F41.9) Anxiety  Comment:   Plan: LORazepam (ATIVAN) 0.5 MG tablet        ED refill.  Metoprolol may be helpful for this symptom also.        Carlos Eduardo Copeland is a 36 year old who presents for the following health issues : blood pressure concerns.    LEESA Copeland has had some variable elevations in blood pressure.  She did 1 at home a couple of days ago which was around 180/100.  Checked in the clinic was not quite this high.    Not having adverse symptoms such as headache or chest pain.    Has a positive family history for hypertension in her father.    Patient also has a history of some anxiety occasionally.  Rarely takes lorazepam.      Review of Systems     Unremarkable except as above.        Objective    BP (!) 144/82   Pulse 108   Temp 97.6  F (36.4  C) (Tympanic)   Resp 14   Ht 1.549 m (5' 1\")   Wt 73.5 kg (162 lb)   LMP 08/19/2021 (Exact Date)   SpO2 99%   Breastfeeding No   BMI 30.61 kg/m    Body mass index is 30.61 kg/m .  Physical Exam     Pressures reviewed.    Medications discussed.        "

## 2021-10-27 ASSESSMENT — ANXIETY QUESTIONNAIRES: GAD7 TOTAL SCORE: 11

## 2021-10-29 DIAGNOSIS — Z30.41 ENCOUNTER FOR SURVEILLANCE OF CONTRACEPTIVE PILLS: ICD-10-CM

## 2021-10-29 DIAGNOSIS — N92.1 MENOMETRORRHAGIA: ICD-10-CM

## 2021-10-29 DIAGNOSIS — Z00.00 ENCOUNTER FOR PREVENTATIVE ADULT HEALTH CARE EXAMINATION: ICD-10-CM

## 2021-10-29 RX ORDER — NORETHINDRONE AND ETHINYL ESTRADIOL 1 MG-35MCG
KIT ORAL
Qty: 84 TABLET | Refills: 1 | Status: SHIPPED | OUTPATIENT
Start: 2021-10-29 | End: 2021-11-30

## 2021-11-11 ENCOUNTER — OFFICE VISIT (OUTPATIENT)
Dept: OBGYN | Facility: CLINIC | Age: 36
End: 2021-11-11
Payer: COMMERCIAL

## 2021-11-11 VITALS
SYSTOLIC BLOOD PRESSURE: 144 MMHG | BODY MASS INDEX: 31.32 KG/M2 | HEIGHT: 61 IN | DIASTOLIC BLOOD PRESSURE: 84 MMHG | WEIGHT: 165.9 LBS

## 2021-11-11 DIAGNOSIS — Z30.017 INSERTION OF IMPLANTABLE SUBDERMAL CONTRACEPTIVE: ICD-10-CM

## 2021-11-11 DIAGNOSIS — Z30.017 ENCOUNTER FOR INITIAL PRESCRIPTION OF IMPLANTABLE SUBDERMAL CONTRACEPTIVE: Primary | ICD-10-CM

## 2021-11-11 PROCEDURE — 99212 OFFICE O/P EST SF 10 MIN: CPT | Mod: 25 | Performed by: FAMILY MEDICINE

## 2021-11-11 PROCEDURE — 11983 REMOVE/INSERT DRUG IMPLANT: CPT | Performed by: FAMILY MEDICINE

## 2021-11-11 ASSESSMENT — MIFFLIN-ST. JEOR: SCORE: 1384.9

## 2021-11-11 NOTE — PROGRESS NOTES
SUBJECTIVE:  Dinorah Romero is an 36 year old   woman who presents for   gynecology consult for nexplanon removal and replacement, also uses Oral Contraceptive to help with period regulation.  No LMP recorded. (Menstrual status: Birth Control). Periods are regular, lasting   4 days. Menarche @ age teen, Dysmenorrhea:none. Cyclic symptoms   include none. No intermenstrual bleeding,   spotting, or discharge.    Current contraception: Oral Contraceptive and nexplanon  History of abnormal Pap smear: No  Family history of uterine or ovarian cancer: No  History of abnormal mammogram: No  Family history of breast cancer: No        Past Medical History:   Diagnosis Date     ALLERGIC RHINITIS      Closed fracture of unspecified part of humerus     Left     DEPRESSIVE DISORDER      Depressive disorder     Have had dx of depression multiple times simce adolescence     Tobacco use disorder      Unspecified closed fracture of ankle     Ankle fracture          Family History   Problem Relation Age of Onset     Cerebrovascular Disease Father      Alcohol/Drug Father      Depression Father      Lipids Father      Alcohol/Drug Brother      Depression Brother      Arthritis Mother      Depression Mother      Depression Sister      Alcohol/Drug Brother      No Known Problems Daughter      Hypertension Paternal Grandmother      Alzheimer Disease Maternal Grandmother      Colon Cancer Other      Breast Cancer No family hx of        Past Surgical History:   Procedure Laterality Date     ENT SURGERY      tonsills     EXCISE LESION TRUNK N/A 2016    Procedure: EXCISE LESION TRUNK;  Surgeon: Kris Younger MD;  Location: [facility] OR       Current Outpatient Medications   Medication     Etonogestrel (NEXPLANON SC)     LORazepam (ATIVAN) 0.5 MG tablet     metoprolol succinate ER (TOPROL-XL) 25 MG 24 hr tablet     Multiple Vitamins-Minerals (MULTI ADULT GUMMIES PO)     NORTREL 1/35, 28, 1-35 MG-MCG tablet     No  "current facility-administered medications for this visit.     Allergies   Allergen Reactions     Zyrtec [Cetirizine Hcl] Rash       Social History     Tobacco Use     Smoking status: Former Smoker     Years: 1.00     Quit date: 10/22/2011     Years since quitting: 10.0     Smokeless tobacco: Never Used     Tobacco comment: 1 pack every 2.5 days   Substance Use Topics     Alcohol use: Yes     Comment: Limited use 2x s mnth glass of wine or 2       Review Of Systems  Ears/Nose/Throat: negative  Respiratory: No shortness of breath, dyspnea on exertion, cough, or hemoptysis  Cardiovascular: negative  Gastrointestinal: negative  Genitourinary: See HPI   Constitutional, HEENT, cardiovascular, pulmonary, GI, , musculoskeletal, neuro, skin, endocrine and psych systems are negative, except as otherwise noted.    OBJECTIVE:  BP (!) 144/84   Ht 1.549 m (5' 1\")   Wt 75.3 kg (165 lb 14.4 oz)   BMI 31.35 kg/m    General appearance: healthy, alert and no distress  Skin: Skin color, texture, turgor normal. No rashes or lesions.  Ears: negative  Nose/Sinuses: Nares normal. Septum midline. Mucosa normal. No drainage or sinus tenderness.  Oropharynx: Lips, mucosa, and tongue normal. Teeth and gums normal.  Neck: Neck supple. No adenopathy. Thyroid symmetric, normal size,, Carotids without bruits.  Lungs: negative, Percussion normal. Good diaphragmatic excursion. Lungs clear  Heart: negative, PMI normal. No lifts, heaves, or thrills. RRR. No murmurs, clicks gallops or rub  Breasts: deferred  Abdomen: Abdomen soft, non-tender. BS normal. No masses, organomegaly    Procedure: nexplanon removal and replacement   The Nexplanon rita was located in the left arm and the area cleansed with betadine.  1% lidocaine with epinephrine was injected into the area and a small skin incision made using a scalpel.  The Implanon/Nexplanon was gently manipulated until the tip was at the incision.  A Bella clamp was used to remove the Nexplanon " completely intact.  A new nexplanon was placed through the incision.  The nexplanon was seen and palpated to have been deployed. The incision site was hemostatic and a steri-strip applied to the area.        ASSESSMENT:  Dinorah Romero is an 36 year old   woman who presents for   gynecology consult for nexplanon removal and replacement, also uses Oral Contraceptive to help with period regulation.   PLAN:  Dx:  1)  Nexplanon removal and replaced       Labs were reviewed in [facility]   Imaging was reviewed in Epic   Tests and documents were reviewed.   Discussion of management or test interpretation       Dr. Lu Burris, DO    Obstetrics and Gynecology  [facility]

## 2021-11-11 NOTE — NURSING NOTE
"Chief Complaint   Patient presents with     Contraception     remove and replace Nexplanon       Initial BP (!) 144/84   Ht 1.549 m (5' 1\")   Wt 75.3 kg (165 lb 14.4 oz)   BMI 31.35 kg/m   Estimated body mass index is 31.35 kg/m  as calculated from the following:    Height as of this encounter: 1.549 m (5' 1\").    Weight as of this encounter: 75.3 kg (165 lb 14.4 oz).  BP completed using cuff size: regular    Questioned patient about current smoking habits.  Pt. quit smoking some time ago.          The following HM Due: NONE         "

## 2021-11-30 ENCOUNTER — OFFICE VISIT (OUTPATIENT)
Dept: OBGYN | Facility: CLINIC | Age: 36
End: 2021-11-30
Payer: COMMERCIAL

## 2021-11-30 VITALS — WEIGHT: 165.3 LBS | BODY MASS INDEX: 31.23 KG/M2 | SYSTOLIC BLOOD PRESSURE: 142 MMHG | DIASTOLIC BLOOD PRESSURE: 84 MMHG

## 2021-11-30 DIAGNOSIS — N92.1 MENOMETRORRHAGIA: ICD-10-CM

## 2021-11-30 DIAGNOSIS — R63.5 WEIGHT GAIN: ICD-10-CM

## 2021-11-30 DIAGNOSIS — Z30.41 ENCOUNTER FOR SURVEILLANCE OF CONTRACEPTIVE PILLS: ICD-10-CM

## 2021-11-30 DIAGNOSIS — Z01.419 ENCOUNTER FOR GYNECOLOGICAL EXAMINATION WITHOUT ABNORMAL FINDING: Primary | ICD-10-CM

## 2021-11-30 DIAGNOSIS — Z00.00 ENCOUNTER FOR PREVENTATIVE ADULT HEALTH CARE EXAMINATION: ICD-10-CM

## 2021-11-30 PROCEDURE — 99395 PREV VISIT EST AGE 18-39: CPT | Performed by: FAMILY MEDICINE

## 2021-11-30 RX ORDER — NALTREXONE HYDROCHLORIDE AND BUPROPION HYDROCHLORIDE 8; 90 MG/1; MG/1
1 TABLET, EXTENDED RELEASE ORAL SEE ADMIN INSTRUCTIONS
Qty: 90 TABLET | Refills: 3 | Status: SHIPPED | OUTPATIENT
Start: 2021-11-30 | End: 2022-09-28

## 2021-11-30 RX ORDER — NALTREXONE HYDROCHLORIDE AND BUPROPION HYDROCHLORIDE 8; 90 MG/1; MG/1
1 TABLET, EXTENDED RELEASE ORAL SEE ADMIN INSTRUCTIONS
Qty: 90 TABLET | Refills: 3 | Status: SHIPPED | OUTPATIENT
Start: 2021-11-30 | End: 2021-11-30

## 2021-11-30 RX ORDER — CYCLOBENZAPRINE HCL 5 MG
TABLET ORAL
COMMUNITY
End: 2024-02-14

## 2021-11-30 RX ORDER — METHYLPREDNISOLONE 4 MG
TABLET, DOSE PACK ORAL
COMMUNITY
Start: 2021-01-24 | End: 2023-11-20

## 2021-11-30 RX ORDER — NORETHINDRONE AND ETHINYL ESTRADIOL 1 MG-35MCG
1 KIT ORAL DAILY
Qty: 84 TABLET | Refills: 4 | Status: SHIPPED | OUTPATIENT
Start: 2021-11-30 | End: 2022-04-18

## 2021-11-30 NOTE — NURSING NOTE
"Chief Complaint   Patient presents with     Physical     Pap 11/15/2018  NIL          Initial BP (!) 142/84 (BP Location: Right arm, Cuff Size: Adult Regular)   Wt 75 kg (165 lb 4.8 oz)   LMP  (LMP Unknown)   Breastfeeding No   BMI 31.23 kg/m   Estimated body mass index is 31.23 kg/m  as calculated from the following:    Height as of 21: 1.549 m (5' 1\").    Weight as of this encounter: 75 kg (165 lb 4.8 oz).  BP completed using cuff size: regular    Questioned patient about current smoking habits.  Pt. quit smoking some time ago.          The following HM Due: NONE      Stephani Herring, CHRISTINE on 2021 at 8:48 AM       "

## 2021-11-30 NOTE — PATIENT INSTRUCTIONS
For fasting labs (for cholesterol):  please Call Lab [phone] [place] or [phone] [place] to schedule labs on a future day   [facility]      Dr. Lu Burris, DO    Obstetrics and Gynecology  [facility] - [place] and [place]

## 2021-11-30 NOTE — PROGRESS NOTES
SUBJECTIVE:  Dinorah Romero is an 36 year old  woman who presents for   annual gyn exam. No LMP recorded (lmp unknown). (Menstrual status: Birth Control). Periods are rare, lasting   Rare days. Dysmenorrhea:mild, occurring premenstrually and first 1-2 days of flow. Cyclic symptoms   include none. No intermenstrual bleeding,   spotting, or discharge.  Menarche age 13.  STD hx: none.    Current contraception: nexplanon and Oral Contraceptive continuously  SAMUEL exposure: no  History of abnormal Pap smear: No  Family history of uterine or ovarian cancer: No  Regular self breast exam: YES.  History of abnormal mammogram: No  Family history of breast cancer: No  History of abnormal lipids: No    Past Medical History:   Diagnosis Date     ALLERGIC RHINITIS      Closed fracture of unspecified part of humerus     Left     DEPRESSIVE DISORDER      Depressive disorder     Have had dx of depression multiple times simce adolescence     Tobacco use disorder      Unspecified closed fracture of ankle     Ankle fracture        Family History   Problem Relation Age of Onset     Cerebrovascular Disease Father      Alcohol/Drug Father      Depression Father      Lipids Father      Alcohol/Drug Brother      Depression Brother      Arthritis Mother      Depression Mother      Depression Sister      Alcohol/Drug Brother      No Known Problems Daughter      Hypertension Paternal Grandmother      Alzheimer Disease Maternal Grandmother      Colon Cancer Other      Breast Cancer No family hx of        Past Surgical History:   Procedure Laterality Date     ENT SURGERY      tonsills     EXCISE LESION TRUNK N/A 2016    Procedure: EXCISE LESION TRUNK;  Surgeon: Kris Younger MD;  Location:  OR       Current Outpatient Medications   Medication     cyclobenzaprine (FLEXERIL) 5 MG tablet     Etonogestrel (NEXPLANON SC)     etonogestrel (NEXPLANON) 68 MG IMPL     LORazepam (ATIVAN) 0.5 MG tablet     metoprolol  succinate ER (TOPROL-XL) 25 MG 24 hr tablet     Multiple Vitamins-Minerals (MULTI ADULT GUMMIES PO)     NORTREL 1/35, 28, 1-35 MG-MCG tablet     methylPREDNISolone (MEDROL DOSEPAK) 4 MG tablet therapy pack     No current facility-administered medications for this visit.     Allergies   Allergen Reactions     Cetirizine Hives     Zyrtec     Zyrtec [Cetirizine Hcl] Rash       Social History     Tobacco Use     Smoking status: Former Smoker     Years: 1.00     Quit date: 10/22/2011     Years since quitting: 10.1     Smokeless tobacco: Never Used     Tobacco comment: 1 pack every 2.5 days   Substance Use Topics     Alcohol use: Yes     Comment: Limited use 2x s mnth glass of wine or 2       Review Of Systems  Ears/Nose/Throat: negative  Respiratory: No shortness of breath, dyspnea on exertion, cough, or hemoptysis  Cardiovascular: negative  Gastrointestinal: negative  Genitourinary: See HPI   Constitutional, HEENT, cardiovascular, pulmonary, GI, , musculoskeletal, neuro, skin, endocrine and psych systems are negative, except as otherwise noted.      OBJECTIVE:  BP (!) 142/84 (BP Location: Right arm, Cuff Size: Adult Regular)   Wt 75 kg (165 lb 4.8 oz)   LMP  (LMP Unknown)   Breastfeeding No   BMI 31.23 kg/m      General appearance: healthy, alert and no distress  Skin: Skin color, texture, turgor normal. No rashes or lesions.  Ears: negative  Nose/Sinuses: Nares normal. Septum midline. Mucosa normal. No drainage or sinus tenderness.  Oropharynx: Lips, mucosa, and tongue normal. Teeth and gums normal.  Neck: Neck supple. No adenopathy. Thyroid symmetric, normal size,, Carotids without bruits.  Lungs: negative, Percussion normal. Good diaphragmatic excursion. Lungs clear  Heart: negative, PMI normal. No lifts, heaves, or thrills. RRR. No murmurs, clicks gallops or rub  Breasts: Inspection negative. No nipple discharge or bleeding. No masses.  Abdomen: Abdomen soft, non-tender. BS normal. No masses,  organomegaly  Pelvic: Pelvic:  Pelvic examination with no pap/no Gonorrhea and Chlamydia   including  External genitalia normal   and vagina normal rugatted not atrophic  Examination of urethra  normal no masses, tenderness, scarring  bladder, no masses or tenderness  Cervix no lesions or discharge  Bimanual exam with   Uterus 6 weeks size, mid position, mobile,no-tenderness, no descent   Adnexa/parametria  Normal no masses      ASSESSMENT:  Dinorah Romero is an 36 year old  woman who presents for   annual gyn exam. Concerns:  Weight gain.     PLAN:  Dx:  1)  Pap smear  2)  Mammography, lipids at appropriate intervals  3)  Covid-19 concerns: covid infection and vaccination recommendations discussed.   4)  Oral Contraceptive refill on continuously.  Has nexplanon in place.    5)  Weight gain after stopping phentermine due to bp issues. Discussed contrave and topamax,   She would like to try contrave.  Discussed healthy diet.   Discussed if nothing works, consider nexplanon removal.     PE:  Reviewed health maintenance including diet, regular exercise   and periodic exams.    Dr. Lu Burris, DO    Obstetrics and Gynecology  [facility] - [place] and [place]

## 2022-03-11 ENCOUNTER — TELEPHONE (OUTPATIENT)
Dept: OBGYN | Facility: CLINIC | Age: 37
End: 2022-03-11
Payer: COMMERCIAL

## 2022-03-11 NOTE — TELEPHONE ENCOUNTER
Pt has been on Contrave since last OV for weight mgmt but the out of pocket cost is more than she would like to pay.  Had discussed Topamax as an alternative-see note from 11/30/21.  Pt would like to try this and see if it is more affordable and how well it works.    Pharmacy selected, please advise.    Renee Frazier RN

## 2022-03-12 NOTE — TELEPHONE ENCOUNTER
Im not familiar with dosage and its use in weight loss, so I would feel uncomfortable in prescribing it. I would wait until Dr. Burris returns for her to discuss it with her.     Grady Arguelles MD

## 2022-04-17 DIAGNOSIS — Z00.00 ENCOUNTER FOR PREVENTATIVE ADULT HEALTH CARE EXAMINATION: ICD-10-CM

## 2022-04-17 DIAGNOSIS — Z30.41 ENCOUNTER FOR SURVEILLANCE OF CONTRACEPTIVE PILLS: ICD-10-CM

## 2022-04-17 DIAGNOSIS — Z01.419 ENCOUNTER FOR GYNECOLOGICAL EXAMINATION WITHOUT ABNORMAL FINDING: ICD-10-CM

## 2022-04-17 DIAGNOSIS — N92.1 MENOMETRORRHAGIA: ICD-10-CM

## 2022-04-18 RX ORDER — NORETHINDRONE AND ETHINYL ESTRADIOL 1 MG-35MCG
1 KIT ORAL DAILY
Qty: 84 TABLET | Refills: 1 | Status: SHIPPED | OUTPATIENT
Start: 2022-04-18 | End: 2022-12-01

## 2022-05-06 DIAGNOSIS — R03.0 ELEVATED BLOOD PRESSURE READING WITHOUT DIAGNOSIS OF HYPERTENSION: ICD-10-CM

## 2022-05-06 NOTE — TELEPHONE ENCOUNTER
"Last 2 IM Office Visits:   10/26/2021 with Dr. Chew  02/06/2020 with Dr. Mosher    Metoprolol 25 mg  Routing refill request to provider for review/approval because:  Blood Pressure out of range for [facility] refill protocol.    BP Readings from Last 3 Encounters:   11/30/21 (!) 142/84   11/11/21 (!) 144/84   10/26/21 (!) 144/82         Routing to covering provider- Last name \"N\"          "

## 2022-05-08 RX ORDER — METOPROLOL SUCCINATE 25 MG/1
25 TABLET, EXTENDED RELEASE ORAL DAILY
Qty: 30 TABLET | Refills: 5 | Status: SHIPPED | OUTPATIENT
Start: 2022-05-08 | End: 2022-09-28

## 2022-09-27 ASSESSMENT — ANXIETY QUESTIONNAIRES
7. FEELING AFRAID AS IF SOMETHING AWFUL MIGHT HAPPEN: NOT AT ALL
5. BEING SO RESTLESS THAT IT IS HARD TO SIT STILL: NOT AT ALL
6. BECOMING EASILY ANNOYED OR IRRITABLE: NOT AT ALL
GAD7 TOTAL SCORE: 1
3. WORRYING TOO MUCH ABOUT DIFFERENT THINGS: NOT AT ALL
8. IF YOU CHECKED OFF ANY PROBLEMS, HOW DIFFICULT HAVE THESE MADE IT FOR YOU TO DO YOUR WORK, TAKE CARE OF THINGS AT HOME, OR GET ALONG WITH OTHER PEOPLE?: NOT DIFFICULT AT ALL
1. FEELING NERVOUS, ANXIOUS, OR ON EDGE: NOT AT ALL
4. TROUBLE RELAXING: SEVERAL DAYS
IF YOU CHECKED OFF ANY PROBLEMS ON THIS QUESTIONNAIRE, HOW DIFFICULT HAVE THESE PROBLEMS MADE IT FOR YOU TO DO YOUR WORK, TAKE CARE OF THINGS AT HOME, OR GET ALONG WITH OTHER PEOPLE: NOT DIFFICULT AT ALL
7. FEELING AFRAID AS IF SOMETHING AWFUL MIGHT HAPPEN: NOT AT ALL
2. NOT BEING ABLE TO STOP OR CONTROL WORRYING: NOT AT ALL
GAD7 TOTAL SCORE: 1

## 2022-09-28 ENCOUNTER — OFFICE VISIT (OUTPATIENT)
Dept: FAMILY MEDICINE | Facility: CLINIC | Age: 37
End: 2022-09-28
Payer: COMMERCIAL

## 2022-09-28 VITALS
WEIGHT: 169.25 LBS | DIASTOLIC BLOOD PRESSURE: 88 MMHG | SYSTOLIC BLOOD PRESSURE: 144 MMHG | BODY MASS INDEX: 31.98 KG/M2 | HEART RATE: 76 BPM | OXYGEN SATURATION: 99 % | TEMPERATURE: 98.3 F | RESPIRATION RATE: 14 BRPM

## 2022-09-28 DIAGNOSIS — Z11.59 NEED FOR HEPATITIS C SCREENING TEST: ICD-10-CM

## 2022-09-28 DIAGNOSIS — I10 BENIGN ESSENTIAL HYPERTENSION: ICD-10-CM

## 2022-09-28 DIAGNOSIS — E66.09 CLASS 1 OBESITY DUE TO EXCESS CALORIES WITHOUT SERIOUS COMORBIDITY WITH BODY MASS INDEX (BMI) OF 31.0 TO 31.9 IN ADULT: Primary | ICD-10-CM

## 2022-09-28 DIAGNOSIS — E66.811 CLASS 1 OBESITY DUE TO EXCESS CALORIES WITHOUT SERIOUS COMORBIDITY WITH BODY MASS INDEX (BMI) OF 31.0 TO 31.9 IN ADULT: Primary | ICD-10-CM

## 2022-09-28 LAB
ALBUMIN SERPL-MCNC: 3.7 G/DL (ref 3.4–5)
ALP SERPL-CCNC: 43 U/L (ref 40–150)
ALT SERPL W P-5'-P-CCNC: 33 U/L (ref 0–50)
ANION GAP SERPL CALCULATED.3IONS-SCNC: 8 MMOL/L (ref 3–14)
AST SERPL W P-5'-P-CCNC: 15 U/L (ref 0–45)
BILIRUB SERPL-MCNC: 0.4 MG/DL (ref 0.2–1.3)
BUN SERPL-MCNC: 15 MG/DL (ref 7–30)
CALCIUM SERPL-MCNC: 9.3 MG/DL (ref 8.5–10.1)
CHLORIDE BLD-SCNC: 107 MMOL/L (ref 94–109)
CO2 SERPL-SCNC: 23 MMOL/L (ref 20–32)
CREAT SERPL-MCNC: 0.72 MG/DL (ref 0.52–1.04)
GFR SERPL CREATININE-BSD FRML MDRD: >90 ML/MIN/1.73M2
GLUCOSE BLD-MCNC: 95 MG/DL (ref 70–99)
HCV AB SERPL QL IA: NONREACTIVE
POTASSIUM BLD-SCNC: 4.7 MMOL/L (ref 3.4–5.3)
PROT SERPL-MCNC: 7.3 G/DL (ref 6.8–8.8)
SODIUM SERPL-SCNC: 138 MMOL/L (ref 133–144)
TSH SERPL DL<=0.005 MIU/L-ACNC: 1.52 MU/L (ref 0.4–4)

## 2022-09-28 PROCEDURE — 36415 COLL VENOUS BLD VENIPUNCTURE: CPT | Performed by: PHYSICIAN ASSISTANT

## 2022-09-28 PROCEDURE — 80053 COMPREHEN METABOLIC PANEL: CPT | Performed by: PHYSICIAN ASSISTANT

## 2022-09-28 PROCEDURE — 86803 HEPATITIS C AB TEST: CPT | Performed by: PHYSICIAN ASSISTANT

## 2022-09-28 PROCEDURE — 84443 ASSAY THYROID STIM HORMONE: CPT | Performed by: PHYSICIAN ASSISTANT

## 2022-09-28 PROCEDURE — 99214 OFFICE O/P EST MOD 30 MIN: CPT | Performed by: PHYSICIAN ASSISTANT

## 2022-09-28 RX ORDER — METOPROLOL SUCCINATE 50 MG/1
50 TABLET, EXTENDED RELEASE ORAL DAILY
Qty: 90 TABLET | Refills: 1 | Status: SHIPPED | OUTPATIENT
Start: 2022-09-28 | End: 2023-04-12

## 2022-09-28 ASSESSMENT — PATIENT HEALTH QUESTIONNAIRE - PHQ9
SUM OF ALL RESPONSES TO PHQ QUESTIONS 1-9: 2
SUM OF ALL RESPONSES TO PHQ QUESTIONS 1-9: 2
10. IF YOU CHECKED OFF ANY PROBLEMS, HOW DIFFICULT HAVE THESE PROBLEMS MADE IT FOR YOU TO DO YOUR WORK, TAKE CARE OF THINGS AT HOME, OR GET ALONG WITH OTHER PEOPLE: NOT DIFFICULT AT ALL

## 2022-09-28 ASSESSMENT — ANXIETY QUESTIONNAIRES: GAD7 TOTAL SCORE: 1

## 2022-09-28 NOTE — PROGRESS NOTES
"  Assessment & Plan     Class 1 obesity due to excess calories without serious comorbidity with body mass index (BMI) of 31.0 to 31.9 in adult  Risks/benefits of medication use discussed with patient. Patient reports understanding and accepts trial of medication.    If Wegovy out of stock, then pt will contact us and will Rx Saxsenda and needles  Await labs.   - Semaglutide-Weight Management 0.25 MG/0.5ML SOAJ; Inject 0.25 mg Subcutaneous every 7 days for 14 days, THEN 0.5 mg every 7 days for 30 days.  - TSH with free T4 reflex; Future    Benign essential hypertension  Metoprolol will increase from 25 to 50 mg daily.   - Comprehensive metabolic panel (BMP + Alb, Alk Phos, ALT, AST, Total. Bili, TP); Future    Need for hepatitis C screening test    - Hepatitis C Screen Reflex to HCV RNA Quant and Genotype; Future             BMI:   Estimated body mass index is 31.98 kg/m  as calculated from the following:    Height as of 11/11/21: 1.549 m (5' 1\").    Weight as of this encounter: 76.8 kg (169 lb 4 oz).           No follow-ups on file.   Follow-up Visit   Expected date:  Oct 28, 2022 (Approximate)      Follow Up Appointment Details:     Follow-up with whom?: Me    Follow-Up for what?: Acute Issue Recheck    Additional Details: weight and BP    How?: In Person or Virtual    Is this an as-needed follow-up?: No                    MONISHA Castillo [facility] BARRON Copeland is a 37 year old, presenting for the following health issues:  Hypertension      History of Present Illness       Reason for visit:  BP and weight gain    She eats 2-3 servings of fruits and vegetables daily.She consumes 0 sweetened beverage(s) daily.She exercises with enough effort to increase her heart rate 30 to 60 minutes per day.  She exercises with enough effort to increase her heart rate 4 days per week. She is missing 1 dose(s) of medications per week.  She is not taking prescribed medications " regularly due to remembering to take.    Today's PHQ-9         PHQ-9 Total Score: 2    PHQ-9 Q9 Thoughts of better off dead/self-harm past 2 weeks :   Not at all    How difficult have these problems made it for you to do your work, take care of things at home, or get along with other people: Not difficult at all  Today's MAYO-7 Score: 1       Concern - weight gain   Onset: a long time   Description: pt states she has always struggled with weight gain     Therapies tried and outcome: contrave(bad side effects) , phentermine     Has taken metoprolol. But BP is increasing. Tolerates it well.     Review of Systems   Constitutional, HEENT, cardiovascular, pulmonary, gi and gu systems are negative, except as otherwise noted.      Objective    BP (!) 144/88   Pulse 76   Temp 98.3  F (36.8  C) (Oral)   Resp 14   Wt 76.8 kg (169 lb 4 oz)   SpO2 99%   BMI 31.98 kg/m    Body mass index is 31.98 kg/m .  Physical Exam   GENERAL APPEARANCE: healthy, alert and no distress  NEURO: Normal strength and tone, mentation intact and speech normal  PSYCH: mentation appears normal and affect normal/bright

## 2022-09-29 ENCOUNTER — TELEPHONE (OUTPATIENT)
Dept: FAMILY MEDICINE | Facility: CLINIC | Age: 37
End: 2022-09-29

## 2022-09-29 DIAGNOSIS — E66.09 CLASS 1 OBESITY DUE TO EXCESS CALORIES WITHOUT SERIOUS COMORBIDITY WITH BODY MASS INDEX (BMI) OF 31.0 TO 31.9 IN ADULT: Primary | ICD-10-CM

## 2022-09-29 DIAGNOSIS — E66.811 CLASS 1 OBESITY DUE TO EXCESS CALORIES WITHOUT SERIOUS COMORBIDITY WITH BODY MASS INDEX (BMI) OF 31.0 TO 31.9 IN ADULT: Primary | ICD-10-CM

## 2022-09-29 NOTE — TELEPHONE ENCOUNTER
Pharmacy sent message stating Wegovy 0.25MG/0.5ML PF PEN INJ is unavailable from supplier until 11/20/2022.    Katheryn Monterroso/CAROLINA

## 2022-09-29 NOTE — TELEPHONE ENCOUNTER
Call pt and let her know. If okay, she can wait. We did discuss the backorder of this.     Then call pharmacy to inform if okay.    Mala Garg PA-C

## 2022-09-30 NOTE — TELEPHONE ENCOUNTER
Pt returns call. Relayed provider message below.     Pt informs she had discussed with Mala Garg PA-C using another medication for initial dosing until backorder of Wegovy is available. Pt informs Saxsenda was discussed. Is this an option for pt until Wegovy is in stock?    Routing to PCP to review and advise.  Yolanda Deshpande RN

## 2022-10-16 ENCOUNTER — HEALTH MAINTENANCE LETTER (OUTPATIENT)
Age: 37
End: 2022-10-16

## 2022-10-20 ENCOUNTER — ALLIED HEALTH/NURSE VISIT (OUTPATIENT)
Dept: NURSING | Facility: CLINIC | Age: 37
End: 2022-10-20
Payer: COMMERCIAL

## 2022-10-20 DIAGNOSIS — Z23 ENCOUNTER FOR IMMUNIZATION: Primary | ICD-10-CM

## 2022-10-20 PROCEDURE — 91312 COVID-19,PF,PFIZER BOOSTER BIVALENT: CPT

## 2022-10-20 PROCEDURE — 0124A COVID-19,PF,PFIZER BOOSTER BIVALENT: CPT

## 2022-10-20 PROCEDURE — 99207 PR NO CHARGE NURSE ONLY: CPT

## 2022-10-20 NOTE — NURSING NOTE
"Chief Complaint   Patient presents with     Allied Health Visit     COVID Booster  Bivalent  Pfizer        Initial There were no vitals taken for this visit. Estimated body mass index is 31.98 kg/m  as calculated from the following:    Height as of 21: 1.549 m (5' 1\").    Weight as of 22: 76.8 kg (169 lb 4 oz).  BP completed using cuff size: NA (Not Taken)    Questioned patient about current smoking habits.  Pt. has never smoked.          The following HM Due: NONE        Pt here for COVID BOOSTER   Bivalent Pfizer         Stephani Herring, CMA on 10/20/2022 at 3:20 PM             "

## 2022-11-09 ENCOUNTER — VIRTUAL VISIT (OUTPATIENT)
Dept: FAMILY MEDICINE | Facility: CLINIC | Age: 37
End: 2022-11-09
Payer: COMMERCIAL

## 2022-11-09 VITALS
WEIGHT: 162.7 LBS | BODY MASS INDEX: 30.72 KG/M2 | DIASTOLIC BLOOD PRESSURE: 80 MMHG | SYSTOLIC BLOOD PRESSURE: 118 MMHG | HEIGHT: 61 IN

## 2022-11-09 DIAGNOSIS — E66.811 CLASS 1 OBESITY DUE TO EXCESS CALORIES WITHOUT SERIOUS COMORBIDITY WITH BODY MASS INDEX (BMI) OF 31.0 TO 31.9 IN ADULT: Primary | ICD-10-CM

## 2022-11-09 DIAGNOSIS — E66.09 CLASS 1 OBESITY DUE TO EXCESS CALORIES WITHOUT SERIOUS COMORBIDITY WITH BODY MASS INDEX (BMI) OF 31.0 TO 31.9 IN ADULT: Primary | ICD-10-CM

## 2022-11-09 DIAGNOSIS — I10 BENIGN ESSENTIAL HYPERTENSION: ICD-10-CM

## 2022-11-09 PROCEDURE — 99213 OFFICE O/P EST LOW 20 MIN: CPT | Mod: 95 | Performed by: PHYSICIAN ASSISTANT

## 2022-11-09 RX ORDER — SEMAGLUTIDE 2.4 MG/.75ML
2.4 INJECTION, SOLUTION SUBCUTANEOUS
Qty: 9 ML | Refills: 1 | Status: SHIPPED | OUTPATIENT
Start: 2022-11-09 | End: 2023-04-18

## 2022-11-09 NOTE — PROGRESS NOTES
"Dinorah is a 37 year old who is being evaluated via a billable video visit.      How would you like to obtain your AVS? MyChart  If the video visit is dropped, the invitation should be resent by: Text to cell phone: [phone]  Will anyone else be joining your video visit? No        Assessment & Plan     Class 1 obesity due to excess calories without serious comorbidity with body mass index (BMI) of 31.0 to 31.9 in adult  Will change from Saxsenda to Wegovy.  Doing well  - Semaglutide-Weight Management (WEGOVY) 2.4 MG/0.75ML SOAJ; Inject 2.4 mg Subcutaneous every 7 days    Benign essential hypertension  Improved with dose adjustment.                BMI:   Estimated body mass index is 30.74 kg/m  as calculated from the following:    Height as of this encounter: 1.549 m (5' 1\").    Weight as of this encounter: 73.8 kg (162 lb 11.2 oz).           No follow-ups on file.    Mala Garg PA-C  [facility]    Carlos Eduardo Copeland is a 37 year old, presenting for the following health issues:  No chief complaint on file.      History of Present Illness       Reason for visit:  Med check/follow up    She eats 2-3 servings of fruits and vegetables daily.She consumes 0 sweetened beverage(s) daily.She exercises with enough effort to increase her heart rate 10 to 19 minutes per day.  She exercises with enough effort to increase her heart rate 3 or less days per week.   She is taking medications regularly.     Current weight: 162.7  Previous weight 172    Saxsenda at 3 mg  Hypertension Follow-up      Do you check your blood pressure regularly outside of the clinic? Yes     Are you following a low salt diet? Yes    Are your blood pressures ever more than 140 on the top number (systolic) OR more   than 90 on the bottom number (diastolic), for example 140/90? No        Review of Systems   Constitutional, HEENT, cardiovascular, pulmonary, gi and gu systems are negative, except as otherwise noted.    "   Objective           Vitals:  No vitals were obtained today due to virtual visit.    Physical Exam   GENERAL: Healthy, alert and no distress  EYES: Eyes grossly normal to inspection.  No discharge or erythema, or obvious scleral/conjunctival abnormalities.  RESP: No audible wheeze, cough, or visible cyanosis.  No visible retractions or increased work of breathing.    SKIN: Visible skin clear. No significant rash, abnormal pigmentation or lesions.  NEURO: Cranial nerves grossly intact.  Mentation and speech appropriate for age.  PSYCH: Mentation appears normal, affect normal/bright, judgement and insight intact, normal speech and appearance well-groomed.                Video-Visit Details    Video Start Time: 1515    Type of service:  Video Visit    Video End Time:3:25 PM    Originating Location (pt. Location): Home    Distant Location (provider location):  On-site    Platform used for Video Visit: Kayla

## 2022-12-01 ENCOUNTER — OFFICE VISIT (OUTPATIENT)
Dept: OBGYN | Facility: CLINIC | Age: 37
End: 2022-12-01
Payer: COMMERCIAL

## 2022-12-01 VITALS
WEIGHT: 164.4 LBS | HEIGHT: 60 IN | SYSTOLIC BLOOD PRESSURE: 130 MMHG | DIASTOLIC BLOOD PRESSURE: 80 MMHG | BODY MASS INDEX: 32.28 KG/M2

## 2022-12-01 DIAGNOSIS — Z13.9 SCREENING FOR CONDITION: Primary | ICD-10-CM

## 2022-12-01 DIAGNOSIS — Z30.41 ENCOUNTER FOR SURVEILLANCE OF CONTRACEPTIVE PILLS: ICD-10-CM

## 2022-12-01 DIAGNOSIS — Z00.00 ENCOUNTER FOR PREVENTATIVE ADULT HEALTH CARE EXAMINATION: ICD-10-CM

## 2022-12-01 DIAGNOSIS — N92.1 MENOMETRORRHAGIA: ICD-10-CM

## 2022-12-01 DIAGNOSIS — Z01.419 ENCOUNTER FOR GYNECOLOGICAL EXAMINATION WITHOUT ABNORMAL FINDING: ICD-10-CM

## 2022-12-01 PROCEDURE — 99395 PREV VISIT EST AGE 18-39: CPT | Performed by: FAMILY MEDICINE

## 2022-12-01 RX ORDER — NORETHINDRONE AND ETHINYL ESTRADIOL 1 MG-35MCG
1 KIT ORAL DAILY
Qty: 120 TABLET | Refills: 4 | Status: SHIPPED | OUTPATIENT
Start: 2022-12-01 | End: 2023-12-04

## 2022-12-01 RX ORDER — NORETHINDRONE AND ETHINYL ESTRADIOL 1 MG-35MCG
1 KIT ORAL DAILY
Qty: 84 TABLET | Refills: 3 | Status: SHIPPED | OUTPATIENT
Start: 2022-12-01 | End: 2022-12-01

## 2022-12-01 NOTE — PATIENT INSTRUCTIONS
Will ask about mounjaro     Labs when fasting     Dr. Lu Burris, DO    Obstetrics and Gynecology  [facility] - [place] and [place]

## 2022-12-01 NOTE — CONFIDENTIAL NOTE
SUBJECTIVE:  Dinorah Romero is an 37 year old  woman who presents for   annual gyn exam. No LMP recorded. (Menstrual status: Birth Control). Periods are rare, lasting   no days. Dysmenorrhea:no. Cyclic symptoms   include none. No intermenstrual bleeding,   spotting, or discharge.  Menarche age no.  STD hx: none.    Current contraception: Nexplanon (placed 21)and Oral Contraceptive continuously   SAMUEL exposure: no  History of abnormal Pap smear: No  Family history of uterine or ovarian cancer: No  Regular self breast exam: YES.  History of abnormal mammogram: No  Family history of breast cancer: No  History of abnormal lipids: No    Past Medical History:   Diagnosis Date     ALLERGIC RHINITIS      Benign essential hypertension 2022     Closed fracture of unspecified part of humerus     Left     DEPRESSIVE DISORDER      Depressive disorder     Have had dx of depression multiple times simce adolescence     Tobacco use disorder      Unspecified closed fracture of ankle     Ankle fracture        Family History   Problem Relation Age of Onset     Cerebrovascular Disease Father      Alcohol/Drug Father      Depression Father      Lipids Father      Alcohol/Drug Brother      Depression Brother      Arthritis Mother      Depression Mother      Depression Sister      Alcohol/Drug Brother      No Known Problems Daughter      Hypertension Paternal Grandmother      Alzheimer Disease Maternal Grandmother      Colon Cancer Other      Breast Cancer No family hx of        Past Surgical History:   Procedure Laterality Date     ENT SURGERY  2016    tonsills     EXCISE LESION TRUNK N/A 2016    Procedure: EXCISE LESION TRUNK;  Surgeon: Kris Younger MD;  Location:  OR       Current Outpatient Medications   Medication     LORazepam (ATIVAN) 0.5 MG tablet     metoprolol succinate ER (TOPROL XL) 50 MG 24 hr tablet     Multiple Vitamins-Minerals (MULTI ADULT GUMMIES PO)     NORTREL , ,  1-35 MG-MCG tablet     Semaglutide-Weight Management (WEGOVY) 2.4 MG/0.75ML SOAJ     cyclobenzaprine (FLEXERIL) 5 MG tablet     etonogestrel (NEXPLANON) 68 MG IMPL     insulin pen needle (32G X 4 MM) 32G X 4 MM miscellaneous     liraglutide - Weight Management (SAXENDA) 18 MG/3ML pen     methylPREDNISolone (MEDROL DOSEPAK) 4 MG tablet therapy pack     No current facility-administered medications for this visit.     Allergies   Allergen Reactions     Cetirizine Hives     Zyrtec     Zyrtec [Cetirizine Hcl] Rash       Social History     Tobacco Use     Smoking status: Former     Years: 1.00     Types: Cigarettes     Quit date: 10/22/2011     Years since quittin.1     Smokeless tobacco: Never     Tobacco comments:     1 pack every 2.5 days   Substance Use Topics     Alcohol use: Yes     Comment: Limited use 2x s mnth glass of wine or 2       Review Of Systems  Ears/Nose/Throat: negative  Respiratory: No shortness of breath, dyspnea on exertion, cough, or hemoptysis  Cardiovascular: negative  Gastrointestinal: negative  Genitourinary: See HPI   Constitutional, HEENT, cardiovascular, pulmonary, GI, , musculoskeletal, neuro, skin, endocrine and psych systems are negative, except as otherwise noted.      OBJECTIVE:  /80   Ht 1.524 m (5')   Wt 74.6 kg (164 lb 6.4 oz)   BMI 32.11 kg/m      General appearance: healthy, alert and no distress  Skin: Skin color, texture, turgor normal. No rashes or lesions.  Ears: negative  Nose/Sinuses: Nares normal. Septum midline. Mucosa normal. No drainage or sinus tenderness.  Oropharynx: Lips, mucosa, and tongue normal. Teeth and gums normal.  Neck: Neck supple. No adenopathy. Thyroid symmetric, normal size,, Carotids without bruits.  Lungs: negative, Percussion normal. Good diaphragmatic excursion. Lungs clear  Heart: negative, PMI normal. No lifts, heaves, or thrills. RRR. No murmurs, clicks gallops or rub  Breasts: Inspection negative. No nipple discharge or bleeding. No  masses.  Abdomen: Abdomen soft, non-tender. BS normal. No masses, organomegaly  Pelvic: Pelvic:  Pelvic examination with no pap/no Gonorrhea and Chlamydia   including  External genitalia normal   and vagina normal rugatted not atrophic  Examination of urethra  normal no masses, tenderness, scarring  bladder, no masses or tenderness  Cervix  no lesions or discharge  Bimanual exam with   Uterus 7 weeks size, mid position, mobile,non-tenderness, normal no descent   Adnexa/parametria  Normal no masses       ASSESSMENT:  Dinorah Romero is an 37 year old  woman who presents for   annual gyn exam. Concerns:  None     PLAN:  Dx:  1)  Pap smear up to date   Gonorrhea  Chlamydia declines  2)  Mammography not due, lipids at appropriate intervals ordered for future   Colonoscopy not due   3)  Covid-19 concerns: covid infection and vaccination recommendations discussed.   4)  Contraception: Nexplanon (placed 21)and Oral Contraceptive continuosly refilled  5)  Weight gain: on wegovy for weight loss:  Having sunburn side effects.   Will either go back to sucsenda, or consider mounjaro      PE:  Reviewed health maintenance including diet, regular exercise   and periodic exams.    Dr. Lu Burris, DO    Obstetrics and Gynecology  [facility] - [place] and [place]

## 2022-12-01 NOTE — NURSING NOTE
Chief Complaint   Patient presents with     Gyn Exam       Initial /80   Ht 1.524 m (5')   Wt 74.6 kg (164 lb 6.4 oz)   BMI 32.11 kg/m   Estimated body mass index is 32.11 kg/m  as calculated from the following:    Height as of this encounter: 1.524 m (5').    Weight as of this encounter: 74.6 kg (164 lb 6.4 oz).  BP completed using cuff size: regular long    Questioned patient about current smoking habits.  Pt. quit smoking some time ago.          The following HM Due: NONE

## 2022-12-30 ENCOUNTER — ALLIED HEALTH/NURSE VISIT (OUTPATIENT)
Dept: NURSING | Facility: CLINIC | Age: 37
End: 2022-12-30
Payer: COMMERCIAL

## 2022-12-30 DIAGNOSIS — J02.9 SORE THROAT: Primary | ICD-10-CM

## 2022-12-30 LAB
DEPRECATED S PYO AG THROAT QL EIA: NEGATIVE
GROUP A STREP BY PCR: NOT DETECTED

## 2022-12-30 PROCEDURE — 87651 STREP A DNA AMP PROBE: CPT

## 2023-02-04 ENCOUNTER — MYC MEDICAL ADVICE (OUTPATIENT)
Dept: FAMILY MEDICINE | Facility: CLINIC | Age: 38
End: 2023-02-04
Payer: COMMERCIAL

## 2023-02-04 NOTE — LETTER
February 15, 2023            RE: Dinorah Romero  [address] [address]  : 1985  MRN: [id number]        To Whom It May Concern,    I am writing on behalf of my patient, Dinorah Romero, to document the medical necessity of Wegovy for the treatment of obesity. She has been actively working on her health and weight. She was previously using Wegovy and it helped to reduce cravings and control appetite.      She had previously tried Saxsenda, which caused significant pain and swelling at the injection site. Dinorah did not have any issues with Wegovy.     I am recommending she continue with the Wegovy for weight reduction and overall health. Wegovy  is medically necessary for this patient s medical condition of obesity and to help prevent future obesity related co-morbidities.     Please call my office at [phone] if I can provide additional information. I look forward to receiving your timely response and approval of this request.     Sincerely,    Mala Garg PA-C

## 2023-02-07 ENCOUNTER — TELEPHONE (OUTPATIENT)
Dept: FAMILY MEDICINE | Facility: CLINIC | Age: 38
End: 2023-02-07
Payer: COMMERCIAL

## 2023-02-07 DIAGNOSIS — E66.09 CLASS 1 OBESITY DUE TO EXCESS CALORIES WITHOUT SERIOUS COMORBIDITY WITH BODY MASS INDEX (BMI) OF 31.0 TO 31.9 IN ADULT: ICD-10-CM

## 2023-02-07 DIAGNOSIS — E66.811 CLASS 1 OBESITY DUE TO EXCESS CALORIES WITHOUT SERIOUS COMORBIDITY WITH BODY MASS INDEX (BMI) OF 31.0 TO 31.9 IN ADULT: ICD-10-CM

## 2023-02-07 NOTE — TELEPHONE ENCOUNTER
Mala Garg PA-C-    TC's are currently handling PA's at this time. Any RN can assist with the process. A separate telephone encounter is created with reason Prior Authorization-medication and all PA's are routed to [facility] PA Med.    This RN initiated a PA for Saxenda and responded via [employer] to inform patient.     Florinda ZARCO RN, BSN, PHN  [facility]  [phone]

## 2023-02-07 NOTE — LETTER
February 15, 2023                 RE: Dinorah Romero  [address] [address]  : 1985  MRN: [id number]           To Whom It May Concern,     I am writing on behalf of my patient, Dinorah Romero, to document the medical necessity of Saxsenda for the treatment of obesity. She has been actively working on her health and weight. She was previously using Saxsenda and it helped to reduce cravings and control appetite.       She had previously tried Wegovy, which caused significant pain and swelling at the injection site. Dinorah did not have any issues with Saxsenda.      I am recommending she continue with the Saxsenda for weight reduction and overall health. Saxsenda is medically necessary for this patient s medical condition of obesity and to help prevent future obesity related co-morbidities.      Please call my office at [phone] if I can provide additional information. I look forward to receiving your timely response and approval of this request.      Sincerely,     Mala Garg PA-C

## 2023-02-07 NOTE — TELEPHONE ENCOUNTER
[place], Shiresa H  You 17 hours ago (12:21 PM)     SO  Please route this to your TC or MA team, they have to start the PA and them route to [facility] Med     This is the message I received. It was previously forwarded by an RN to the [facility] [place] PA pool.    What is the correct process for this?

## 2023-02-07 NOTE — TELEPHONE ENCOUNTER
Patient sent [employer] message informing PA needed: Good morning! I just received a letter in the mail stating that my Saxenda prescription (with our new [employer] insurance) will require a prior authorization and that I should contact my providers office to initiate that before I m out of my medication. I will be due for my refill in roughly a week. Could I please request we start the PA for this medication?     Routing to PA pool to process.     Florinda ZARCO RN, BSN, PHN - PAL (patient advocate liaison)  [facility]  [phone]

## 2023-02-10 NOTE — TELEPHONE ENCOUNTER
Central Prior Authorization Team   Phone: [phone]      PA Initiation    Medication: liraglutide - Weight Management (SAXENDA) 18 MG/3ML pen - PA INITIATED  Insurance Company: [employer] - Phone [phone] Fax [phone]  Pharmacy Filling the Rx: [id number] - [place], [place] - [facility] [address] AT [facility] [address]  Filling Pharmacy Phone: [phone]  Filling Pharmacy Fax:    Start Date: 2/10/2023

## 2023-02-10 NOTE — TELEPHONE ENCOUNTER
Patient reports current weight is 165.4 lbs, Patient reports she is now out of the medication. RN will inform PA team that patient is out of medication.     Florinda ZARCO RN, BSN, PHN, PAL (patient advocate liaison)   [facility]  [phone]

## 2023-02-10 NOTE — TELEPHONE ENCOUNTER
PRIOR AUTHORIZATION DENIED    Medication: liraglutide - Weight Management (SAXENDA) 18 MG/3ML pen - PA DENIED    Denial Date: 2/10/2023    Denial Rational: COVERAGE REQUIRES ACHIEVED AND MAINTAINED AT LEAST 4% WEIGHT LOSS AFTER 4 MONTHS OF TREATMENT      Appeal Information: IF PROVIDER WOULD LIKE TO APPEAL THIS DECISION PLEASE PROVIDE PA TEAM WITH LETTER OF MEDICAL NECESSITY

## 2023-02-13 NOTE — TELEPHONE ENCOUNTER
February 11, 2023  Dinorah Romero  to [facility] Triage (supporting Mala Garg, MONISHA)      10:48 AM  I got a call yesterday for a weight and I forgot to tell the nurse & wanted to add in that I am on my period right now so my weight does fluctuate usually 2-4 lbs during my menstruation. Not sure if that is of any significance or not.      Thanks again!  Dinorah

## 2023-02-13 NOTE — TELEPHONE ENCOUNTER
Added to PA encounter.  PA denied already.  Does not appear enough weight lost.  Kae Braga, RN    Denial Rational: COVERAGE REQUIRES ACHIEVED AND MAINTAINED AT LEAST 4% WEIGHT LOSS AFTER 4 MONTHS OF TREATMENT

## 2023-02-14 NOTE — TELEPHONE ENCOUNTER
Really a lot of information to weed thru.   What we need to know is starting weight and weight currently and calculate if that is 4% or not.   Please route back.

## 2023-02-14 NOTE — TELEPHONE ENCOUNTER
If that's the weight lost and current weight then it does not qualify for an appeal. Please inform patient.   She could contact her insurance to see what else, if any other medication is covered for weight loss.

## 2023-02-14 NOTE — TELEPHONE ENCOUNTER
Starting weight 9/28/22 169.4# in clinic.  Patient reported weight at 165.4# on 2/10/23.  4# weight loss since starting med.  This is 2.36% lost and needs to be 4% lost if I calculated correctly.  Please advise.  Kae Braga RN

## 2023-02-16 NOTE — TELEPHONE ENCOUNTER
Letter faxed to [facility] at [phone], 02/16/2023    Katheryn Monterroso/CAROLINA

## 2023-02-16 NOTE — TELEPHONE ENCOUNTER
[facility] at [phone], 02/16/2023    Katheryn Monterroso/CAROLINA

## 2023-04-03 ENCOUNTER — MYC MEDICAL ADVICE (OUTPATIENT)
Dept: FAMILY MEDICINE | Facility: CLINIC | Age: 38
End: 2023-04-03
Payer: COMMERCIAL

## 2023-04-11 DIAGNOSIS — I10 BENIGN ESSENTIAL HYPERTENSION: ICD-10-CM

## 2023-04-12 RX ORDER — METOPROLOL SUCCINATE 50 MG/1
TABLET, EXTENDED RELEASE ORAL
Qty: 90 TABLET | Refills: 1 | Status: SHIPPED | OUTPATIENT
Start: 2023-04-12 | End: 2023-10-06

## 2023-04-12 NOTE — TELEPHONE ENCOUNTER
Prescription approved per [facility] Refill Protocol.  Garima Foster RN

## 2023-04-18 ENCOUNTER — VIRTUAL VISIT (OUTPATIENT)
Dept: FAMILY MEDICINE | Facility: CLINIC | Age: 38
End: 2023-04-18
Payer: COMMERCIAL

## 2023-04-18 DIAGNOSIS — E66.811 CLASS 1 OBESITY DUE TO EXCESS CALORIES WITHOUT SERIOUS COMORBIDITY WITH BODY MASS INDEX (BMI) OF 31.0 TO 31.9 IN ADULT: ICD-10-CM

## 2023-04-18 DIAGNOSIS — E66.09 CLASS 1 OBESITY DUE TO EXCESS CALORIES WITHOUT SERIOUS COMORBIDITY WITH BODY MASS INDEX (BMI) OF 31.0 TO 31.9 IN ADULT: ICD-10-CM

## 2023-04-18 PROCEDURE — 99213 OFFICE O/P EST LOW 20 MIN: CPT | Mod: VID | Performed by: PHYSICIAN ASSISTANT

## 2023-04-18 RX ORDER — PHENTERMINE HYDROCHLORIDE 37.5 MG/1
37.5 TABLET ORAL
Qty: 90 TABLET | Refills: 1 | Status: SHIPPED | OUTPATIENT
Start: 2023-04-18 | End: 2023-10-24

## 2023-04-18 NOTE — PROGRESS NOTES
Dinorah is a 37 year old who is being evaluated via a billable video visit.      How would you like to obtain your AVS? MyChart  If the video visit is dropped, the invitation should be resent by: Text to cell phone: [phone]  Will anyone else be joining your video visit? No        Assessment & Plan     Class 1 obesity due to excess calories without serious comorbidity with body mass index (BMI) of 31.0 to 31.9 in adult  Feels weight has hit plateau.  Will add phentermine.  If no improvement consider referral to weight mgmt clinic.   - liraglutide - Weight Management (SAXENDA) 18 MG/3ML pen; Inject 3 mg Subcutaneous daily  - phentermine (ADIPEX-P) 37.5 MG tablet; Take 1 tablet (37.5 mg) by mouth every morning (before breakfast)             BMI:   Estimated body mass index is 32.11 kg/m  as calculated from the following:    Height as of 12/1/22: 1.524 m (5').    Weight as of 12/1/22: 74.6 kg (164 lb 6.4 oz).           Mala Garg PA-C  [facility]   Dinorah is a 37 year old, presenting for the following health issues:  Recheck Medication         View : No data to display.              History of Present Illness       Reason for visit:  Wanting to discuss plateau on Saxenda and whether I shoudl stay on it or possibly switch back to Phentermine    She eats 2-3 servings of fruits and vegetables daily.She consumes 0 sweetened beverage(s) daily.She exercises with enough effort to increase her heart rate 10 to 19 minutes per day.  She exercises with enough effort to increase her heart rate 3 or less days per week.   She is taking medications regularly.               Review of Systems   Constitutional, HEENT, cardiovascular, pulmonary, gi and gu systems are negative, except as otherwise noted.      Objective    Vitals - Patient Reported  Systolic (Patient Reported): 121  Diastolic (Patient Reported): 84  Blood pressure taken with manual cuff (will exclude from quality  measure): Yes  Weight (Patient Reported): 73.8 kg (162 lb 12.8 oz)  Height (Patient Reported): 152.4 cm (5')  BMI (Based on Pt Reported Ht/Wt): 31.79      Vitals:  No vitals were obtained today due to virtual visit.    Physical Exam   GENERAL: Healthy, alert and no distress  EYES: Eyes grossly normal to inspection.  No discharge or erythema, or obvious scleral/conjunctival abnormalities.  RESP: No audible wheeze, cough, or visible cyanosis.  No visible retractions or increased work of breathing.    SKIN: Visible skin clear. No significant rash, abnormal pigmentation or lesions.  NEURO: Cranial nerves grossly intact.  Mentation and speech appropriate for age.  PSYCH: Mentation appears normal, affect normal/bright, judgement and insight intact, normal speech and appearance well-groomed.                Video-Visit Details    Type of service:  Video Visit     Originating Location (pt. Location): Home  Distant Location (provider location):  Off-site  Platform used for Video Visit: [employer]

## 2023-04-22 DIAGNOSIS — E66.811 CLASS 1 OBESITY DUE TO EXCESS CALORIES WITHOUT SERIOUS COMORBIDITY WITH BODY MASS INDEX (BMI) OF 31.0 TO 31.9 IN ADULT: ICD-10-CM

## 2023-04-22 DIAGNOSIS — E66.09 CLASS 1 OBESITY DUE TO EXCESS CALORIES WITHOUT SERIOUS COMORBIDITY WITH BODY MASS INDEX (BMI) OF 31.0 TO 31.9 IN ADULT: ICD-10-CM

## 2023-04-25 RX ORDER — LIRAGLUTIDE 6 MG/ML
INJECTION, SOLUTION SUBCUTANEOUS
Qty: 15 ML | Refills: 4 | OUTPATIENT
Start: 2023-04-25

## 2023-07-07 ENCOUNTER — TELEPHONE (OUTPATIENT)
Dept: FAMILY MEDICINE | Facility: CLINIC | Age: 38
End: 2023-07-07
Payer: COMMERCIAL

## 2023-07-07 ENCOUNTER — TELEPHONE (OUTPATIENT)
Dept: FAMILY MEDICINE | Facility: CLINIC | Age: 38
End: 2023-07-07

## 2023-07-07 ENCOUNTER — NURSE TRIAGE (OUTPATIENT)
Dept: NURSING | Facility: CLINIC | Age: 38
End: 2023-07-07
Payer: COMMERCIAL

## 2023-07-07 DIAGNOSIS — E66.09 CLASS 1 OBESITY DUE TO EXCESS CALORIES WITHOUT SERIOUS COMORBIDITY WITH BODY MASS INDEX (BMI) OF 31.0 TO 31.9 IN ADULT: ICD-10-CM

## 2023-07-07 DIAGNOSIS — E66.811 CLASS 1 OBESITY DUE TO EXCESS CALORIES WITHOUT SERIOUS COMORBIDITY WITH BODY MASS INDEX (BMI) OF 31.0 TO 31.9 IN ADULT: ICD-10-CM

## 2023-07-07 NOTE — TELEPHONE ENCOUNTER
Dinorah calling requesting prior authorization for:  liraglutide - Weight Management (SAXENDA) 18 MG/3ML pen.  [facility] [phone]    Bella Quiroz RN on 7/7/2023 at 4:47 PM    Reason for Disposition    Prescription refill request for NON-ESSENTIAL medicine (i.e., no harm to patient if med not taken) and triager unable to refill per department policy    Protocols used: MEDICATION REFILL AND RENEWAL CALL-A-OH

## 2023-07-07 NOTE — TELEPHONE ENCOUNTER
Prior Auth Needed:     Disp Refills Start End LELAND   liraglutide - Weight Management (SAXENDA) 18 MG/3ML pen 15 mL 4 4/18/2023  No   Sig - Route: Inject 3 mg Subcutaneous daily - Subcutaneous   Sent to pharmacy as: Liraglutide -Weight Management 18 MG/3ML Subcutaneous Solution

## 2023-07-10 NOTE — TELEPHONE ENCOUNTER
Pt informs aware of PA, see other encounter, will close this encounter  Rayne Ace RN, BSN  [facility]

## 2023-07-10 NOTE — TELEPHONE ENCOUNTER
left message for call back, see PA encounter, appears previous PA  23, also confirm pharmacy as cannot find this matching with the phone number listed  Rayne Ace RN

## 2023-07-11 ENCOUNTER — MYC MEDICAL ADVICE (OUTPATIENT)
Dept: FAMILY MEDICINE | Facility: CLINIC | Age: 38
End: 2023-07-11
Payer: COMMERCIAL

## 2023-07-12 NOTE — TELEPHONE ENCOUNTER
PA Initiation    Medication: SAXENDA 18 MG/3ML SC SOPN  Insurance Company: [employer] - Phone [phone] Fax [phone]  Pharmacy Filling the Rx: [facility] [place]  [place] - [address]  Filling Pharmacy Phone: [phone]  Filling Pharmacy Fax:    Start Date: 7/12/2023

## 2023-07-13 NOTE — TELEPHONE ENCOUNTER
Prior Authorization Approval    Medication: SAXENDA 18 MG/3ML SC SOPN  Authorization Effective Date: 7/13/2023  Authorization Expiration Date: 7/12/2024  Approved Dose/Quantity: 15/30  Reference #: [id number]   Insurance Company: [employer] Phone [phone] Fax [phone]  Expected CoPay:       CoPay Card Available:      Financial Assistance Needed:   Which Pharmacy is filling the prescription: [facility] [place]  [place] - [address] [facility]  Pharmacy Notified: Yes  Patient Notified: Yes

## 2023-09-22 DIAGNOSIS — E66.09 CLASS 1 OBESITY DUE TO EXCESS CALORIES WITHOUT SERIOUS COMORBIDITY WITH BODY MASS INDEX (BMI) OF 31.0 TO 31.9 IN ADULT: ICD-10-CM

## 2023-09-22 DIAGNOSIS — E66.811 CLASS 1 OBESITY DUE TO EXCESS CALORIES WITHOUT SERIOUS COMORBIDITY WITH BODY MASS INDEX (BMI) OF 31.0 TO 31.9 IN ADULT: ICD-10-CM

## 2023-09-25 RX ORDER — PEN NEEDLE, DIABETIC 32GX 5/32"
NEEDLE, DISPOSABLE MISCELLANEOUS
Qty: 100 EACH | Refills: 0 | Status: SHIPPED | OUTPATIENT
Start: 2023-09-25 | End: 2024-04-25

## 2023-09-25 NOTE — TELEPHONE ENCOUNTER
Prescription approved per [facility] Refill Protocol bd pen needle jean and gen.

## 2023-10-06 DIAGNOSIS — I10 BENIGN ESSENTIAL HYPERTENSION: ICD-10-CM

## 2023-10-06 RX ORDER — METOPROLOL SUCCINATE 50 MG/1
TABLET, EXTENDED RELEASE ORAL
Qty: 90 TABLET | Refills: 0 | Status: SHIPPED | OUTPATIENT
Start: 2023-10-06 | End: 2024-01-05

## 2023-10-13 ENCOUNTER — LAB (OUTPATIENT)
Dept: LAB | Facility: CLINIC | Age: 38
End: 2023-10-13
Payer: COMMERCIAL

## 2023-10-13 DIAGNOSIS — Z01.419 ENCOUNTER FOR GYNECOLOGICAL EXAMINATION WITHOUT ABNORMAL FINDING: ICD-10-CM

## 2023-10-13 DIAGNOSIS — Z13.9 SCREENING FOR CONDITION: ICD-10-CM

## 2023-10-13 LAB
CHOLEST SERPL-MCNC: 163 MG/DL
ERYTHROCYTE [DISTWIDTH] IN BLOOD BY AUTOMATED COUNT: 12 % (ref 10–15)
HCT VFR BLD AUTO: 41.2 % (ref 35–47)
HDLC SERPL-MCNC: 48 MG/DL
HGB BLD-MCNC: 14.1 G/DL (ref 11.7–15.7)
LDLC SERPL CALC-MCNC: 97 MG/DL
MCH RBC QN AUTO: 30.1 PG (ref 26.5–33)
MCHC RBC AUTO-ENTMCNC: 34.2 G/DL (ref 31.5–36.5)
MCV RBC AUTO: 88 FL (ref 78–100)
NONHDLC SERPL-MCNC: 115 MG/DL
PLATELET # BLD AUTO: 322 10E3/UL (ref 150–450)
RBC # BLD AUTO: 4.69 10E6/UL (ref 3.8–5.2)
TRIGL SERPL-MCNC: 90 MG/DL
WBC # BLD AUTO: 10.9 10E3/UL (ref 4–11)

## 2023-10-13 PROCEDURE — 85027 COMPLETE CBC AUTOMATED: CPT

## 2023-10-13 PROCEDURE — 80061 LIPID PANEL: CPT

## 2023-10-13 PROCEDURE — 36415 COLL VENOUS BLD VENIPUNCTURE: CPT

## 2023-10-24 DIAGNOSIS — E66.09 CLASS 1 OBESITY DUE TO EXCESS CALORIES WITHOUT SERIOUS COMORBIDITY WITH BODY MASS INDEX (BMI) OF 31.0 TO 31.9 IN ADULT: ICD-10-CM

## 2023-10-24 DIAGNOSIS — E66.811 CLASS 1 OBESITY DUE TO EXCESS CALORIES WITHOUT SERIOUS COMORBIDITY WITH BODY MASS INDEX (BMI) OF 31.0 TO 31.9 IN ADULT: ICD-10-CM

## 2023-10-24 RX ORDER — LIRAGLUTIDE 6 MG/ML
3 INJECTION, SOLUTION SUBCUTANEOUS DAILY
Qty: 15 ML | Refills: 4 | Status: SHIPPED | OUTPATIENT
Start: 2023-10-24 | End: 2024-03-13

## 2023-10-24 RX ORDER — PHENTERMINE HYDROCHLORIDE 37.5 MG/1
TABLET ORAL
Qty: 90 TABLET | Refills: 1 | Status: SHIPPED | OUTPATIENT
Start: 2023-10-24 | End: 2024-03-13

## 2023-11-01 ENCOUNTER — PATIENT OUTREACH (OUTPATIENT)
Dept: CARE COORDINATION | Facility: CLINIC | Age: 38
End: 2023-11-01
Payer: COMMERCIAL

## 2023-11-20 ENCOUNTER — E-VISIT (OUTPATIENT)
Dept: FAMILY MEDICINE | Facility: CLINIC | Age: 38
End: 2023-11-20
Payer: COMMERCIAL

## 2023-11-20 DIAGNOSIS — J01.90 ACUTE SINUSITIS, RECURRENCE NOT SPECIFIED, UNSPECIFIED LOCATION: Primary | ICD-10-CM

## 2023-11-20 PROCEDURE — 99421 OL DIG E/M SVC 5-10 MIN: CPT | Performed by: PHYSICIAN ASSISTANT

## 2023-11-20 RX ORDER — AMOXICILLIN 875 MG
875 TABLET ORAL 2 TIMES DAILY
Qty: 20 TABLET | Refills: 0 | Status: SHIPPED | OUTPATIENT
Start: 2023-11-20 | End: 2023-11-30

## 2023-11-20 NOTE — PATIENT INSTRUCTIONS
Acute Sinusitis: Care Instructions  Overview     Acute sinusitis is an inflammation of the mucous membranes inside the nose and sinuses. Sinuses are the hollow spaces in your skull around the eyes and nose. Acute sinusitis often follows a cold. Acute sinusitis causes thick, discolored mucus that drains from the nose or down the back of the throat. It also can cause pain and pressure in your head and face along with a stuffy or blocked nose.  In most cases, sinusitis gets better on its own in 1 to 2 weeks. But some mild symptoms may last for several weeks. Sometimes antibiotics are needed if there is a bacterial infection.  Follow-up care is a key part of your treatment and safety. Be sure to make and go to all appointments, and call your doctor if you are having problems. It's also a good idea to know your test results and keep a list of the medicines you take.  How can you care for yourself at home?  Use saline (saltwater) nasal washes. This can help keep your nasal passages open and wash out mucus and allergens.  You can buy saline nose washes at a grocery store or drugstore. Follow the instructions on the package.  You can make your own at home. Add 1 teaspoon of non-iodized salt and 1 teaspoon of baking soda to 2 cups of distilled or boiled and cooled water. Fill a squeeze bottle or a nasal cleansing pot (such as a neti pot) with the nasal wash. Then put the tip into your nostril, and lean over the sink. With your mouth open, gently squirt the liquid. Repeat on the other side.  Try a decongestant nasal spray like oxymetazoline (Afrin). Do not use it for more than 3 days in a row. Using it for more than 3 days can make your congestion worse.  If needed, take an over-the-counter pain medicine, such as acetaminophen (Tylenol), ibuprofen (Advil, Motrin), or naproxen (Aleve). Read and follow all instructions on the label.  If the doctor prescribed antibiotics, take them as directed. Do not stop taking them just  "because you feel better. You need to take the full course of antibiotics.  Be careful when taking over-the-counter cold or flu medicines and Tylenol at the same time. Many of these medicines have acetaminophen, which is Tylenol. Read the labels to make sure that you are not taking more than the recommended dose. Too much acetaminophen (Tylenol) can be harmful.  Try a steroid nasal spray. It may help with your symptoms.  Breathe warm, moist air. You can use a steamy shower, a hot bath, or a sink filled with hot water. Avoid cold, dry air. Using a humidifier in your home may help. Follow the directions for cleaning the machine.  When should you call for help?   Call your doctor now or seek immediate medical care if:    You have new or worse swelling, redness, or pain in your face or around one or both of your eyes.     You have double vision or a change in your vision.     You have a high fever.     You have a severe headache and a stiff neck.     You have mental changes, such as feeling confused or much less alert.   Watch closely for changes in your health, and be sure to contact your doctor if:    You are not getting better as expected.   Where can you learn more?  Go to https://www.Stitcher.net/patiented  Enter I933 in the search box to learn more about \"Acute Sinusitis: Care Instructions.\"  Current as of: February 28, 2023               Content Version: 13.8    [id number] [employer].   Care instructions adapted under license by your healthcare professional. If you have questions about a medical condition or this instruction, always ask your healthcare professional. [employer] disclaims any warranty or liability for your use of this information.      Dear Dinorah Romero    After reviewing your responses, I've been able to diagnose you with Acute sinusitis, recurrence not specified, unspecified location.      Based on your responses and diagnosis, I have prescribed amoxicillin   to " treat your symptoms. I have sent this to your pharmacy.?     It is also important to stay well hydrated, get lots of rest and take over-the-counter decongestants,?tylenol?or ibuprofen if you?are able to?take those medications per your primary care provider to help relieve discomfort.?     It is important that you take?all of?your prescribed medication even if your symptoms are improving after a few doses.? Taking?all of?your medicine helps prevent the symptoms from returning.?     If your symptoms worsen, you develop severe headache, vomiting, high fever (>102), or are not improving in 7 days, please contact your primary care provider for an appointment or visit any of our convenient Walk-in Care or Urgent Care Centers to be seen which can be found on our website?here.?     Thanks again for choosing?us?as your health care partner,?   ?  Rebeka Hartley PA-C?

## 2023-12-29 ENCOUNTER — MYC MEDICAL ADVICE (OUTPATIENT)
Dept: FAMILY MEDICINE | Facility: CLINIC | Age: 38
End: 2023-12-29
Payer: COMMERCIAL

## 2023-12-29 DIAGNOSIS — E66.09 CLASS 1 OBESITY DUE TO EXCESS CALORIES WITHOUT SERIOUS COMORBIDITY WITH BODY MASS INDEX (BMI) OF 31.0 TO 31.9 IN ADULT: ICD-10-CM

## 2023-12-29 DIAGNOSIS — E66.811 CLASS 1 OBESITY DUE TO EXCESS CALORIES WITHOUT SERIOUS COMORBIDITY WITH BODY MASS INDEX (BMI) OF 31.0 TO 31.9 IN ADULT: ICD-10-CM

## 2024-01-01 NOTE — NURSING NOTE
"Chief Complaint   Patient presents with     Recheck Medication     phentermine       Initial /64 (BP Location: Left arm, Patient Position: Sitting, Cuff Size: Adult Regular)   Ht 1.556 m (5' 1.25\")   Wt 64.7 kg (142 lb 9.6 oz)   BMI 26.72 kg/m   Estimated body mass index is 26.72 kg/m  as calculated from the following:    Height as of this encounter: 1.556 m (5' 1.25\").    Weight as of this encounter: 64.7 kg (142 lb 9.6 oz).  BP completed using cuff size: regular    Questioned patient about current smoking habits.  Pt. quit smoking some time ago.          The following HM Due: NONE             "
N/A

## 2024-01-02 NOTE — TELEPHONE ENCOUNTER
See [employer] update, routed to PCP,  ok for virtual visit to discuss? F2F?, INFORM pt of plan via [employer]  Rayne Ace RN, BSN  [facility]

## 2024-01-05 DIAGNOSIS — I10 BENIGN ESSENTIAL HYPERTENSION: ICD-10-CM

## 2024-01-05 RX ORDER — METOPROLOL SUCCINATE 50 MG/1
TABLET, EXTENDED RELEASE ORAL
Qty: 90 TABLET | Refills: 0 | Status: SHIPPED | OUTPATIENT
Start: 2024-01-05 | End: 2024-03-13

## 2024-01-13 ENCOUNTER — HEALTH MAINTENANCE LETTER (OUTPATIENT)
Age: 39
End: 2024-01-13

## 2024-02-14 ENCOUNTER — OFFICE VISIT (OUTPATIENT)
Dept: OBGYN | Facility: CLINIC | Age: 39
End: 2024-02-14
Payer: COMMERCIAL

## 2024-02-14 VITALS — SYSTOLIC BLOOD PRESSURE: 122 MMHG | DIASTOLIC BLOOD PRESSURE: 70 MMHG | BODY MASS INDEX: 30.95 KG/M2 | WEIGHT: 158.5 LBS

## 2024-02-14 DIAGNOSIS — Z12.4 SCREENING FOR CERVICAL CANCER: ICD-10-CM

## 2024-02-14 DIAGNOSIS — Z01.419 ENCOUNTER FOR ANNUAL ROUTINE GYNECOLOGICAL EXAMINATION: Primary | ICD-10-CM

## 2024-02-14 DIAGNOSIS — Z01.419 ENCOUNTER FOR GYNECOLOGICAL EXAMINATION WITHOUT ABNORMAL FINDING: ICD-10-CM

## 2024-02-14 DIAGNOSIS — Z11.3 ROUTINE SCREENING FOR STI (SEXUALLY TRANSMITTED INFECTION): ICD-10-CM

## 2024-02-14 DIAGNOSIS — Z00.00 ENCOUNTER FOR PREVENTATIVE ADULT HEALTH CARE EXAMINATION: ICD-10-CM

## 2024-02-14 DIAGNOSIS — N92.1 MENOMETRORRHAGIA: ICD-10-CM

## 2024-02-14 DIAGNOSIS — Z30.41 ENCOUNTER FOR SURVEILLANCE OF CONTRACEPTIVE PILLS: ICD-10-CM

## 2024-02-14 LAB
ERYTHROCYTE [DISTWIDTH] IN BLOOD BY AUTOMATED COUNT: 12.8 % (ref 10–15)
HCT VFR BLD AUTO: 43.9 % (ref 35–47)
HGB BLD-MCNC: 14.7 G/DL (ref 11.7–15.7)
MCH RBC QN AUTO: 29.5 PG (ref 26.5–33)
MCHC RBC AUTO-ENTMCNC: 33.5 G/DL (ref 31.5–36.5)
MCV RBC AUTO: 88 FL (ref 78–100)
PLATELET # BLD AUTO: 382 10E3/UL (ref 150–450)
RBC # BLD AUTO: 4.99 10E6/UL (ref 3.8–5.2)
WBC # BLD AUTO: 12.3 10E3/UL (ref 4–11)

## 2024-02-14 PROCEDURE — 87624 HPV HI-RISK TYP POOLED RSLT: CPT | Performed by: FAMILY MEDICINE

## 2024-02-14 PROCEDURE — 87591 N.GONORRHOEAE DNA AMP PROB: CPT | Performed by: FAMILY MEDICINE

## 2024-02-14 PROCEDURE — 84443 ASSAY THYROID STIM HORMONE: CPT | Performed by: FAMILY MEDICINE

## 2024-02-14 PROCEDURE — 36415 COLL VENOUS BLD VENIPUNCTURE: CPT | Performed by: FAMILY MEDICINE

## 2024-02-14 PROCEDURE — 80053 COMPREHEN METABOLIC PANEL: CPT | Performed by: FAMILY MEDICINE

## 2024-02-14 PROCEDURE — 87491 CHLMYD TRACH DNA AMP PROBE: CPT | Performed by: FAMILY MEDICINE

## 2024-02-14 PROCEDURE — G0145 SCR C/V CYTO,THINLAYER,RESCR: HCPCS | Performed by: FAMILY MEDICINE

## 2024-02-14 PROCEDURE — 99395 PREV VISIT EST AGE 18-39: CPT | Performed by: FAMILY MEDICINE

## 2024-02-14 PROCEDURE — 85027 COMPLETE CBC AUTOMATED: CPT | Performed by: FAMILY MEDICINE

## 2024-02-14 PROCEDURE — 80061 LIPID PANEL: CPT | Performed by: FAMILY MEDICINE

## 2024-02-14 RX ORDER — NORETHINDRONE AND ETHINYL ESTRADIOL 1 MG-35MCG
1 KIT ORAL DAILY
Qty: 120 TABLET | Refills: 4 | Status: SHIPPED | OUTPATIENT
Start: 2024-02-14 | End: 2024-07-31

## 2024-02-14 NOTE — NURSING NOTE
Chief Complaint   Patient presents with    Physical     Last pap was 11/15/2018- NIL with negative HR HPV    Recheck Medication     OCP       Initial /70   Wt 71.9 kg (158 lb 8 oz)   BMI 30.95 kg/m   Estimated body mass index is 30.95 kg/m  as calculated from the following:    Height as of 22: 1.524 m (5').    Weight as of this encounter: 71.9 kg (158 lb 8 oz).  BP completed using cuff size: regular    Questioned patient about current smoking habits.  Pt. quit smoking some time ago.          The following HM Due: pap smear    Federico Valentin CMA

## 2024-02-14 NOTE — PATIENT INSTRUCTIONS
Labs today     Dr. Lu Burris, DO    Obstetrics and Gynecology  [facility] [place] [facility] [place]

## 2024-02-14 NOTE — PROGRESS NOTES
SUBJECTIVE:  Dinorah Romero is an 38 year old  woman who presents for   annual gyn exam. No LMP recorded. (Menstrual status: Birth Control). Periods are spotting rarely, lasting   1 days. Dysmenorrhea:none. Cyclic symptoms   include none. No intermenstrual bleeding,   spotting, or discharge.  Menarche age teenager.  STD hx: none.    Current contraception: Nexplanon (placed 21)and Oral Contraceptive continuously   SAMUEL exposure: no  History of abnormal Pap smear: No  Family history of uterine or ovarian cancer: No  Regular self breast exam: YES.  History of abnormal mammogram: No  Family history of breast cancer:     Past Medical History:   Diagnosis Date    ALLERGIC RHINITIS     Benign essential hypertension 2022    Closed fracture of unspecified part of humerus     Left    DEPRESSIVE DISORDER     Depressive disorder     Have had dx of depression multiple times simce adolescence    Tobacco use disorder     Unspecified closed fracture of ankle     Ankle fracture        Family History   Problem Relation Age of Onset    Cerebrovascular Disease Father     Alcohol/Drug Father     Depression Father     Lipids Father     Alcohol/Drug Brother     Depression Brother     Arthritis Mother     Depression Mother     Depression Sister     Alcohol/Drug Brother     No Known Problems Daughter     Hypertension Paternal Grandmother     Alzheimer Disease Maternal Grandmother     Colon Cancer Other     Breast Cancer No family hx of        Past Surgical History:   Procedure Laterality Date    ENT SURGERY      tonsills    EXCISE LESION TRUNK N/A 2016    Procedure: EXCISE LESION TRUNK;  Surgeon: Kris Younger MD;  Location: [facility] OR       Current Outpatient Medications   Medication    cyclobenzaprine (FLEXERIL) 5 MG tablet    etonogestrel (NEXPLANON) 68 MG IMPL    insulin pen needle (BD PEN NEEDLE TOBIN 2ND GEN) 32G X 4 MM miscellaneous    LORazepam (ATIVAN) 0.5 MG tablet    metoprolol succinate  ER (TOPROL XL) 50 MG 24 hr tablet    Multiple Vitamins-Minerals (MULTI ADULT GUMMIES PO)    norethindrone-ethinyl estradiol (NORTREL 1/35, 28,) 1-35 MG-MCG tablet    phentermine (ADIPEX-P) 37.5 MG tablet    SAXENDA 18 MG/3ML pen     No current facility-administered medications for this visit.     Allergies   Allergen Reactions    Cetirizine Hives     Zyrtec    Zyrtec [Cetirizine Hcl] Rash       Social History     Tobacco Use    Smoking status: Former     Years: 1     Types: Cigarettes     Quit date: 10/22/2011     Years since quittin.3    Smokeless tobacco: Never    Tobacco comments:     1 pack every 2.5 days   Substance Use Topics    Alcohol use: Yes     Comment: Limited use 2x s mnth glass of wine or 2       Review Of Systems  Ears/Nose/Throat: negative  Respiratory: No shortness of breath, dyspnea on exertion, cough, or hemoptysis  Cardiovascular: negative  Gastrointestinal: negative  Genitourinary: See HPI   Constitutional, HEENT, cardiovascular, pulmonary, GI, , musculoskeletal, neuro, skin, endocrine and psych systems are negative, except as otherwise noted.      OBJECTIVE:  /70   Wt 71.9 kg (158 lb 8 oz)   BMI 30.95 kg/m       General appearance: healthy, alert and no distress  Skin: Skin color, texture, turgor normal. No rashes or lesions.  Ears: negative  Nose/Sinuses: Nares normal. Septum midline. Mucosa normal. No drainage or sinus tenderness.  Oropharynx: Lips, mucosa, and tongue normal. Teeth and gums normal.  Neck: Neck supple. No adenopathy. Thyroid symmetric, normal size,, Carotids without bruits.  Lungs: negative, Percussion normal. Good diaphragmatic excursion. Lungs clear  Heart: negative, PMI normal. No lifts, heaves, or thrills. RRR. No murmurs, clicks gallops or rub  Breasts: Inspection negative. No nipple discharge or bleeding. No masses.  Abdomen: Abdomen soft, non-tender. BS normal. No masses, organomegaly  Pelvic: Pelvic:  Pelvic examination with  pap/ Gonorrhea and Chlamydia    including  External genitalia normal   and vagina normal rugatted not atrophic  Examination of urethra  normal no masses, tenderness, scarring  bladder, no masses or tenderness  Cervix  no lesions or discharge  Bimanual exam with   Uterus 8 weeks size, mid position, mobile,no-tenderness, normal no descent   Adnexa/parametria  normal no masses         ASSESSMENT:  Dinorah Romero is an 38 year old  woman who presents for   annual gyn exam.     PLAN:  Dx:  1)  Pap smear completed   Gonorrhea  Chlamydia completed   2)  Mammography not due, lipids at appropriate intervals ordered   Colonoscopy not due   3)  Contraception: Nexplanon, Oral Contraceptive   4)  BMI, 30.95:  previously was on saxenda, previously had weight management referral   Planning to make appointment  Can consider nexplanon removal if no drugs approved or no progress made with weight       PE:  Reviewed health maintenance including diet, regular exercise   and periodic exams.    Dr. Lu Burris, DO    Obstetrics and Gynecology  [facility] [place] [facility] [place]

## 2024-02-15 LAB
ALBUMIN SERPL BCG-MCNC: 4.5 G/DL (ref 3.5–5.2)
ALP SERPL-CCNC: 65 U/L (ref 40–150)
ALT SERPL W P-5'-P-CCNC: 29 U/L (ref 0–50)
ANION GAP SERPL CALCULATED.3IONS-SCNC: 11 MMOL/L (ref 7–15)
AST SERPL W P-5'-P-CCNC: 22 U/L (ref 0–45)
BILIRUB SERPL-MCNC: 0.5 MG/DL
BUN SERPL-MCNC: 14.1 MG/DL (ref 6–20)
C TRACH DNA SPEC QL NAA+PROBE: NEGATIVE
CALCIUM SERPL-MCNC: 9.6 MG/DL (ref 8.6–10)
CHLORIDE SERPL-SCNC: 102 MMOL/L (ref 98–107)
CHOLEST SERPL-MCNC: 237 MG/DL
CREAT SERPL-MCNC: 0.8 MG/DL (ref 0.51–0.95)
DEPRECATED HCO3 PLAS-SCNC: 26 MMOL/L (ref 22–29)
EGFRCR SERPLBLD CKD-EPI 2021: >90 ML/MIN/1.73M2
FASTING STATUS PATIENT QL REPORTED: YES
GLUCOSE SERPL-MCNC: 83 MG/DL (ref 70–99)
HDLC SERPL-MCNC: 51 MG/DL
LDLC SERPL CALC-MCNC: 154 MG/DL
N GONORRHOEA DNA SPEC QL NAA+PROBE: NEGATIVE
NONHDLC SERPL-MCNC: 186 MG/DL
POTASSIUM SERPL-SCNC: 4.4 MMOL/L (ref 3.4–5.3)
PROT SERPL-MCNC: 7.5 G/DL (ref 6.4–8.3)
SODIUM SERPL-SCNC: 139 MMOL/L (ref 135–145)
TRIGL SERPL-MCNC: 161 MG/DL
TSH SERPL DL<=0.005 MIU/L-ACNC: 1.22 UIU/ML (ref 0.3–4.2)

## 2024-02-16 LAB
BKR LAB AP GYN ADEQUACY: NORMAL
BKR LAB AP GYN INTERPRETATION: NORMAL
BKR LAB AP HPV REFLEX: NORMAL
BKR LAB AP PREVIOUS ABNORMAL: NORMAL
PATH REPORT.COMMENTS IMP SPEC: NORMAL
PATH REPORT.COMMENTS IMP SPEC: NORMAL
PATH REPORT.RELEVANT HX SPEC: NORMAL

## 2024-02-22 LAB
HUMAN PAPILLOMA VIRUS 16 DNA: NEGATIVE
HUMAN PAPILLOMA VIRUS 18 DNA: NEGATIVE
HUMAN PAPILLOMA VIRUS FINAL DIAGNOSIS: NORMAL
HUMAN PAPILLOMA VIRUS OTHER HR: NEGATIVE

## 2024-03-12 SDOH — HEALTH STABILITY: PHYSICAL HEALTH: ON AVERAGE, HOW MANY MINUTES DO YOU ENGAGE IN EXERCISE AT THIS LEVEL?: 20 MIN

## 2024-03-12 SDOH — HEALTH STABILITY: PHYSICAL HEALTH: ON AVERAGE, HOW MANY DAYS PER WEEK DO YOU ENGAGE IN MODERATE TO STRENUOUS EXERCISE (LIKE A BRISK WALK)?: 4 DAYS

## 2024-03-12 ASSESSMENT — SOCIAL DETERMINANTS OF HEALTH (SDOH)
DO YOU BELONG TO ANY CLUBS OR ORGANIZATIONS SUCH AS CHURCH GROUPS UNIONS, FRATERNAL OR ATHLETIC GROUPS, OR SCHOOL GROUPS?: YES
HOW OFTEN DO YOU GET TOGETHER WITH FRIENDS OR RELATIVES?: ONCE A WEEK
HOW OFTEN DO YOU ATTEND CHURCH OR RELIGIOUS SERVICES?: MORE THAN 4 TIMES PER YEAR
HOW OFTEN DO YOU GET TOGETHER WITH FRIENDS OR RELATIVES?: ONCE A WEEK
IN A TYPICAL WEEK, HOW MANY TIMES DO YOU TALK ON THE PHONE WITH FAMILY, FRIENDS, OR NEIGHBORS?: ONCE A WEEK
HOW OFTEN DO YOU ATTENT MEETINGS OF THE CLUB OR ORGANIZATION YOU BELONG TO?: MORE THAN 4 TIMES PER YEAR

## 2024-03-12 ASSESSMENT — LIFESTYLE VARIABLES
HOW MANY STANDARD DRINKS CONTAINING ALCOHOL DO YOU HAVE ON A TYPICAL DAY: 1 OR 2
SKIP TO QUESTIONS 9-10: 0
HOW OFTEN DO YOU HAVE A DRINK CONTAINING ALCOHOL: 2-4 TIMES A MONTH
AUDIT-C TOTAL SCORE: 3
HOW OFTEN DO YOU HAVE SIX OR MORE DRINKS ON ONE OCCASION: LESS THAN MONTHLY

## 2024-03-13 ENCOUNTER — OFFICE VISIT (OUTPATIENT)
Dept: FAMILY MEDICINE | Facility: CLINIC | Age: 39
End: 2024-03-13
Payer: COMMERCIAL

## 2024-03-13 VITALS
BODY MASS INDEX: 32.24 KG/M2 | DIASTOLIC BLOOD PRESSURE: 86 MMHG | OXYGEN SATURATION: 100 % | TEMPERATURE: 98.1 F | SYSTOLIC BLOOD PRESSURE: 135 MMHG | RESPIRATION RATE: 16 BRPM | WEIGHT: 164.2 LBS | HEART RATE: 77 BPM | HEIGHT: 60 IN

## 2024-03-13 DIAGNOSIS — E66.811 CLASS 1 OBESITY DUE TO EXCESS CALORIES WITH SERIOUS COMORBIDITY AND BODY MASS INDEX (BMI) OF 32.0 TO 32.9 IN ADULT: ICD-10-CM

## 2024-03-13 DIAGNOSIS — E66.09 CLASS 1 OBESITY DUE TO EXCESS CALORIES WITH SERIOUS COMORBIDITY AND BODY MASS INDEX (BMI) OF 32.0 TO 32.9 IN ADULT: ICD-10-CM

## 2024-03-13 DIAGNOSIS — I10 BENIGN ESSENTIAL HYPERTENSION: Primary | ICD-10-CM

## 2024-03-13 PROCEDURE — 99213 OFFICE O/P EST LOW 20 MIN: CPT | Mod: 25 | Performed by: PHYSICIAN ASSISTANT

## 2024-03-13 PROCEDURE — 90715 TDAP VACCINE 7 YRS/> IM: CPT | Performed by: PHYSICIAN ASSISTANT

## 2024-03-13 PROCEDURE — 90471 IMMUNIZATION ADMIN: CPT | Performed by: PHYSICIAN ASSISTANT

## 2024-03-13 RX ORDER — PHENTERMINE HYDROCHLORIDE 37.5 MG/1
TABLET ORAL
Qty: 90 TABLET | Refills: 1 | Status: SHIPPED | OUTPATIENT
Start: 2024-03-13 | End: 2024-09-11

## 2024-03-13 RX ORDER — METOPROLOL SUCCINATE 50 MG/1
50 TABLET, EXTENDED RELEASE ORAL DAILY
Qty: 90 TABLET | Refills: 3 | Status: SHIPPED | OUTPATIENT
Start: 2024-03-13 | End: 2024-04-11

## 2024-03-13 NOTE — PROGRESS NOTES
Assessment & Plan     (I10) Benign essential hypertension  (primary encounter diagnosis)  Comment: stable with medications. Labs reviewed.   Plan: metoprolol succinate ER (TOPROL XL) 50 MG 24 hr        tablet            (E66.09,  Z68.32) Class 1 obesity due to excess calories with serious comorbidity and body mass index (BMI) of 32.0 to 32.9 in adult  Comment: restart. Looking into glp-1 meds outside of [facility]  Plan: phentermine (ADIPEX-P) 37.5 MG tablet                      BMI  Estimated body mass index is 32.07 kg/m  as calculated from the following:    Height as of this encounter: 1.524 m (5').    Weight as of this encounter: 74.5 kg (164 lb 3.2 oz).       Counseling  Appropriate preventive services were discussed with this patient, including applicable screening as appropriate for fall prevention, nutrition, physical activity, Tobacco-use cessation, weight loss and cognition.  Checklist reviewing preventive services available has been given to the patient.  Reviewed patient's diet, addressing concerns and/or questions.   The patient was instructed to see the dentist every 6 months.   She is at risk for psychosocial distress and has been provided with information to reduce risk.           Carlos Eduardo Copeland is a 38 year old, presenting for the following health issues:  Recheck Medication      3/13/2024     9:57 AM   Additional Questions   Roomed by Leti   Accompanied by Self     HPI     Weight has gone back up since insurance not paying for glp-1. Working on diet and exercise, but feeling frustrated.  Concerns over elevated cholesterol too at physical.    Hypertension Follow-up    Do you check your blood pressure regularly outside of the clinic? Yes   Are you following a low salt diet? No  Are your blood pressures ever more than 140 on the top number (systolic) OR more   than 90 on the bottom number (diastolic), for example 140/90? No        Review of Systems  Constitutional, HEENT, cardiovascular, pulmonary,  gi and gu systems are negative, except as otherwise noted.      Objective    /86 (BP Location: Left arm, Patient Position: Sitting, Cuff Size: Adult Regular)   Pulse 77   Temp 98.1  F (36.7  C) (Oral)   Resp 16   Ht 1.524 m (5')   Wt 74.5 kg (164 lb 3.2 oz)   SpO2 100%   BMI 32.07 kg/m    Body mass index is 32.07 kg/m .  Physical Exam   GENERAL: alert and no distress  CV: regular rate and rhythm, normal S1 S2, no S3 or S4, no murmur, click or rub, no peripheral edema   PSYCH: mentation appears normal, affect normal/bright            Signed Electronically by: Mala Garg PA-C

## 2024-03-13 NOTE — PATIENT INSTRUCTIONS
Preventive Care Advice   This is general advice given by our system to help you stay healthy. However, your care team may have specific advice just for you. Please talk to your care team about your preventive care needs.  Nutrition  Eat 5 or more servings of fruits and vegetables each day.  Try wheat bread, brown rice and whole grain pasta (instead of white bread, rice, and pasta).  Get enough calcium and vitamin D. Check the label on foods and aim for 100% of the RDA (recommended daily allowance).  Lifestyle  Exercise at least 150 minutes each week   (30 minutes a day, 5 days a week).  Do muscle strengthening activities 2 days a week. These help control your weight and prevent disease.  No smoking.  Wear sunscreen to prevent skin cancer.  Have a dental exam and cleaning every 6 months.  Yearly exams  See your health care team every year to talk about:  Any changes in your health.  Any medicines your care team has prescribed.  Preventive care, family planning, and ways to prevent chronic diseases.  Shots (vaccines)   HPV shots (up to age 26), if you've never had them before.  Hepatitis B shots (up to age 59), if you've never had them before.  COVID-19 shot: Get this shot when it's due.  Flu shot: Get a flu shot every year.  Tetanus shot: Get a tetanus shot every 10 years.  Pneumococcal, hepatitis A, and RSV shots: Ask your care team if you need these based on your risk.  Shingles shot (for age 50 and up).  General health tests  Diabetes screening:  Starting at age 35, Get screened for diabetes at least every 3 years.  If you are younger than age 35, ask your care team if you should be screened for diabetes.  Cholesterol test: At age 39, start having a cholesterol test every 5 years, or more often if advised.  Bone density scan (DEXA): At age 50, ask your care team if you should have this scan for osteoporosis (brittle bones).  Hepatitis C: Get tested at least once in your life.  STIs (sexually transmitted  infections)  Before age 24: Ask your care team if you should be screened for STIs.  After age 24: Get screened for STIs if you're at risk. You are at risk for STIs (including HIV) if:  You are sexually active with more than one person.  You don't use condoms every time.  You or a partner was diagnosed with a sexually transmitted infection.  If you are at risk for HIV, ask about PrEP medicine to prevent HIV.  Get tested for HIV at least once in your life, whether you are at risk for HIV or not.  Cancer screening tests  Cervical cancer screening: If you have a cervix, begin getting regular cervical cancer screening tests at age 21. Most people who have regular screenings with normal results can stop after age 65. Talk about this with your provider.  Breast cancer scan (mammogram): If you've ever had breasts, begin having regular mammograms starting at age 40. This is a scan to check for breast cancer.  Colon cancer screening: It is important to start screening for colon cancer at age 45.  Have a colonoscopy test every 10 years (or more often if you're at risk) Or, ask your provider about stool tests like a FIT test every year or Cologuard test every 3 years.  To learn more about your testing options, visit: https://[email or web address].  For help making a decision, visit: https://bit.ly/qb49892.  Prostate cancer screening test: If you have a prostate and are age 55 to 69, ask your provider if you would benefit from a yearly prostate cancer screening test.  Lung cancer screening: If you are a current or former smoker age 50 to 80, ask your care team if ongoing lung cancer screenings are right for you.  For informational purposes only. Not to replace the advice of your health care provider. Copyright   2023 [place] [employer] All rights reserved. Clinically reviewed by the [facility]. [employer] 468499 - REV 01/24.    Learning About Stress  What is stress?     Stress is your  body's response to a hard situation. Your body can have a physical, emotional, or mental response. Stress is a fact of life for most people, and it affects everyone differently. What causes stress for you may not be stressful for someone else.  A lot of things can cause stress. You may feel stress when you go on a job interview, take a test, or run a race. This kind of short-term stress is normal and even useful. It can help you if you need to work hard or react quickly. For example, stress can help you finish an important job on time.  Long-term stress is caused by ongoing stressful situations or events. Examples of long-term stress include long-term health problems, ongoing problems at work, or conflicts in your family. Long-term stress can harm your health.  How does stress affect your health?  When you are stressed, your body responds as though you are in danger. It makes hormones that speed up your heart, make you breathe faster, and give you a burst of energy. This is called the fight-or-flight stress response. If the stress is over quickly, your body goes back to normal and no harm is done.  But if stress happens too often or lasts too long, it can have bad effects. Long-term stress can make you more likely to get sick, and it can make symptoms of some diseases worse. If you tense up when you are stressed, you may develop neck, shoulder, or low back pain. Stress is linked to high blood pressure and heart disease.  Stress also harms your emotional health. It can make you sabillon, tense, or depressed. Your relationships may suffer, and you may not do well at work or school.  What can you do to manage stress?  You can try these things to help manage stress:   Do something active. Exercise or activity can help reduce stress. Walking is a great way to get started. Even everyday activities such as housecleaning or yard work can help.  Try yoga or salomon chi. These techniques combine exercise and meditation. You may need  some training at first to learn them.  Do something you enjoy. For example, listen to music or go to a movie. Practice your hobby or do volunteer work.  Meditate. This can help you relax, because you are not worrying about what happened before or what may happen in the future.  Do guided imagery. Imagine yourself in any setting that helps you feel calm. You can use online videos, books, or a teacher to guide you.  Do breathing exercises. For example:  From a standing position, bend forward from the waist with your knees slightly bent. Let your arms dangle close to the floor.  Breathe in slowly and deeply as you return to a standing position. Roll up slowly and lift your head last.  Hold your breath for just a few seconds in the standing position.  Breathe out slowly and bend forward from the waist.  Let your feelings out. Talk, laugh, cry, and express anger when you need to. Talking with supportive friends or family, a counselor, or a viral leader about your feelings is a healthy way to relieve stress. Avoid discussing your feelings with people who make you feel worse.  Write. It may help to write about things that are bothering you. This helps you find out how much stress you feel and what is causing it. When you know this, you can find better ways to cope.  What can you do to prevent stress?  You might try some of these things to help prevent stress:  Manage your time. This helps you find time to do the things you want and need to do.  Get enough sleep. Your body recovers from the stresses of the day while you are sleeping.  Get support. Your family, friends, and community can make a difference in how you experience stress.  Limit your news feed. Avoid or limit time on social media or news that may make you feel stressed.  Do something active. Exercise or activity can help reduce stress. Walking is a great way to get started.  Where can you learn more?  Go to https://www.healthwise.net/patiented  Enter N032 in the  "search box to learn more about \"Learning About Stress.\"  Current as of: February 26, 2023               Content Version: 13.8    3595-8153 [employer].   Care instructions adapted under license by your healthcare professional. If you have questions about a medical condition or this instruction, always ask your healthcare professional. [employer] disclaims any warranty or liability for your use of this information.      Substance Use Disorder: Care Instructions  Overview     You can improve your life and health by stopping your use of alcohol or drugs. When you don't drink or use drugs, you may feel and sleep better. You may get along better with your family, friends, and coworkers. There are medicines and programs that can help with substance use disorder.  How can you care for yourself at home?  Here are some ways to help you stay sober and prevent relapse.  If you have been given medicine to help keep you sober or reduce your cravings, be sure to take it exactly as prescribed.  Talk to your doctor about programs that can help you stop using drugs or drinking alcohol.  Do not keep alcohol or drugs in your home.  Plan ahead. Think about what you'll say if other people ask you to drink or use drugs. Try not to spend time with people who drink or use drugs.  Use the time and money spent on drinking or drugs to do something that's important to you.  Preventing a relapse  Have a plan to deal with relapse. Learn to recognize changes in your thinking that lead you to drink or use drugs. Get help before you start to drink or use drugs again.  Try to stay away from situations, friends, or places that may lead you to drink or use drugs.  If you feel the need to drink alcohol or use drugs again, seek help right away. Call a trusted friend or family member. Some people get support from organizations such as Narcotics Anonymous or [employer] or from treatment facilities.  If you relapse, get help " as soon as you can. Some people make a plan with another person that outlines what they want that person to do for them if they relapse. The plan usually includes how to handle the relapse and who to notify in case of relapse.  Don't give up. Remember that a relapse doesn't mean that you have failed. Use the experience to learn the triggers that lead you to drink or use drugs. Then quit again. Recovery is a lifelong process. Many people have several relapses before they are able to quit for good.  Follow-up care is a key part of your treatment and safety. Be sure to make and go to all appointments, and call your doctor if you are having problems. It's also a good idea to know your test results and keep a list of the medicines you take.  When should you call for help?   Call 911  anytime you think you may need emergency care. For example, call if you or someone else:    Has overdosed or has withdrawal signs. Be sure to tell the emergency workers that you are or someone else is using or trying to quit using drugs. Overdose or withdrawal signs may include:  Losing consciousness.  Seizure.  Seeing or hearing things that aren't there (hallucinations).     Is thinking or talking about suicide or harming others.   Where to get help 24 hours a day, 7 days a week   If you or someone you know talks about suicide, self-harm, a mental health crisis, a substance use crisis, or any other kind of emotional distress, get help right away. You can:    Call the Suicide and Crisis Lifeline at 98.     Call 3-951-013-TALK (1-594.859.6490).     Text HOME to 430463 to access the Crisis Text Line.   Consider saving these numbers in your phone.  Go to Netcordialine.org for more information or to chat online.  Call your doctor now or seek immediate medical care if:    You are having withdrawal symptoms. These may include nausea or vomiting, sweating, shakiness, and anxiety.   Watch closely for changes in your health, and be sure to contact  "your doctor if:    You have a relapse.     You need more help or support to stop.   Where can you learn more?  Go to https://www.healthStadius.net/patiented  Enter H573 in the search box to learn more about \"Substance Use Disorder: Care Instructions.\"  Current as of: March 21, 2023               Content Version: 13.8    6931-6280 [employer].   Care instructions adapted under license by your healthcare professional. If you have questions about a medical condition or this instruction, always ask your healthcare professional. Healthwise, [employer] disclaims any warranty or liability for your use of this information.      "

## 2024-04-11 DIAGNOSIS — I10 BENIGN ESSENTIAL HYPERTENSION: ICD-10-CM

## 2024-04-11 RX ORDER — METOPROLOL SUCCINATE 50 MG/1
50 TABLET, EXTENDED RELEASE ORAL DAILY
Qty: 90 TABLET | Refills: 3 | Status: SHIPPED | OUTPATIENT
Start: 2024-04-11 | End: 2024-06-19

## 2024-04-11 NOTE — TELEPHONE ENCOUNTER
Epic not allowing RN to cancel old script and resend to new pharmacy.    Maria Elena Hadley RN, BSN  [facility]

## 2024-04-25 ENCOUNTER — OFFICE VISIT (OUTPATIENT)
Dept: FAMILY MEDICINE | Facility: CLINIC | Age: 39
End: 2024-04-25

## 2024-04-25 VITALS
OXYGEN SATURATION: 100 % | DIASTOLIC BLOOD PRESSURE: 73 MMHG | BODY MASS INDEX: 33.14 KG/M2 | SYSTOLIC BLOOD PRESSURE: 130 MMHG | HEART RATE: 102 BPM | WEIGHT: 168.8 LBS | HEIGHT: 60 IN

## 2024-04-25 DIAGNOSIS — I10 BENIGN ESSENTIAL HYPERTENSION: ICD-10-CM

## 2024-04-25 DIAGNOSIS — E66.811 CLASS 1 OBESITY DUE TO EXCESS CALORIES WITH SERIOUS COMORBIDITY AND BODY MASS INDEX (BMI) OF 32.0 TO 32.9 IN ADULT: Primary | ICD-10-CM

## 2024-04-25 DIAGNOSIS — E78.00 HYPERCHOLESTEREMIA: ICD-10-CM

## 2024-04-25 DIAGNOSIS — E66.09 CLASS 1 OBESITY DUE TO EXCESS CALORIES WITH SERIOUS COMORBIDITY AND BODY MASS INDEX (BMI) OF 32.0 TO 32.9 IN ADULT: Primary | ICD-10-CM

## 2024-04-25 PROCEDURE — 99204 OFFICE O/P NEW MOD 45 MIN: CPT | Performed by: FAMILY MEDICINE

## 2024-04-25 NOTE — PROGRESS NOTES
"SUBJECTIVE:    Dinorah Romero, is a 38 year old female presenting for the below:     1. Hypertension :since 2018 metoprolol XR 50 mg daily. Stopped phentermine at time of Dx.   2. BMI 32. Numerous changes with insurance and work and would like to talk about options for medication for weight loss: Was on Saxenda (174 lbs ->164 lbs with top dose for 12 months with plateau of weight loss), but insurance no longer covers. Has tried many meds in the past. 174 -175 lbs is set point body weight without medication. Is able to lose weight only with obsessive working out -2 hrs per day), calorie restricting and prior meds. Unsure of a Fhx of T2DM. Currently on phentermine 37.5 mg daily.  with h/o gastric bypass.   3. PMHx of suicide attempt 7th grade, hospitalized. Recurrence 2005. Follows with psychotherapy every other week. Feels mood is stable and MDD is in the past now. Lorazepam 0.5 mg prn for sleep and flares of anxiety. 1 x per month.   4. Hypercholesterolemia : last labs feb 2024 : elevated.  Father: CVA in early 60s.     Contraception : nexplanon and POP : heavy and irregular menses. Bleeds for 7-10 days. Follows with gynecology. Considering hysterectomy. Unsure at this time. No issues conceiving.       OBJECTIVE:  Vitals:    04/25/24 1007   BP: 130/73   Pulse: 102   SpO2: 100%   Weight: 76.6 kg (168 lb 12.8 oz)   Height: 1.53 m (5' 0.25\")    Body mass index is 32.69 kg/m .  General: no acute distress, cooperative with exam.  Psych: mental status normal, mood and affect appropriate.      ASSESSMENT / PLAN:      Class 1 obesity due to excess calories with serious comorbidity and body mass index (BMI) of 32.0 to 32.9 in adult  -Agreed together to treat obesity-associated medical conditions and conditions exacerbated by or contributing to weight gain by medical management of weight:  -Discussed the need to address all four pillars of medical weight management (nutrition, physical activity, behavior and " medication) going forward for best chances of success in sustained weight loss  -Discussed obesity as being a chronic, relapsing, multifactorial condition with a neurohormonal basis and that medical management should be though of as a long term treatment.  -Managed expectations of weight loss and discussed aiming for sustained loss.  -An understanding of the necessity for commitment to life-long follow-up, lifestyle changes and dietary modification required for long-term success was also verbalized.    Offered medical weight management. Issued with intake form to complete. Will return with this for dedicated medical weight management first appointment.  -plan to perform baseline body composition analysis at initial appointment.     -prior sub-optimal effect with Saxsenda (plateau of moderate weight loss at highest dose for 12 months). Mechanism of action, common side effects and how  to take Zepbound discussed.     If insurance dose not cover, would be interested in compounded semaglutide via  pharmacy at OOP cost  -     tirzepatide-Weight Management (ZEPBOUND) 2.5 MG/0.5ML prefilled pen; Inject 0.5 mLs (2.5 mg) Subcutaneous every 7 days for 30 days    Benign essential hypertension  At target of <140/90.  Consider switchinhg from BB (moderate obesogenic effect) to alterative newer antihypertensive with prosper protective effect (ACEi/ARB) at follow up     Hypercholesteremia  Will manage in part with medical weight management       Follow up:  Initial MWM appointment in 4-6 weeks (may have started GLP 1/Gip by that time)

## 2024-05-08 ENCOUNTER — TELEPHONE (OUTPATIENT)
Dept: FAMILY MEDICINE | Facility: CLINIC | Age: 39
End: 2024-05-08

## 2024-05-08 NOTE — TELEPHONE ENCOUNTER
Prior authorization done via CoverMyMeds for Zepbound. Prior authorization denied. Patient notified of denial. Renee Archuleta

## 2024-06-19 ENCOUNTER — OFFICE VISIT (OUTPATIENT)
Dept: FAMILY MEDICINE | Facility: CLINIC | Age: 39
End: 2024-06-19

## 2024-06-19 VITALS
HEIGHT: 63 IN | SYSTOLIC BLOOD PRESSURE: 124 MMHG | OXYGEN SATURATION: 96 % | WEIGHT: 163.6 LBS | BODY MASS INDEX: 28.99 KG/M2 | HEART RATE: 89 BPM | DIASTOLIC BLOOD PRESSURE: 67 MMHG

## 2024-06-19 DIAGNOSIS — E66.811 CLASS 1 OBESITY DUE TO EXCESS CALORIES WITH SERIOUS COMORBIDITY AND BODY MASS INDEX (BMI) OF 32.0 TO 32.9 IN ADULT: Primary | ICD-10-CM

## 2024-06-19 DIAGNOSIS — I10 BENIGN ESSENTIAL HYPERTENSION: ICD-10-CM

## 2024-06-19 DIAGNOSIS — E66.09 CLASS 1 OBESITY DUE TO EXCESS CALORIES WITH SERIOUS COMORBIDITY AND BODY MASS INDEX (BMI) OF 32.0 TO 32.9 IN ADULT: Primary | ICD-10-CM

## 2024-06-19 PROCEDURE — G2211 COMPLEX E/M VISIT ADD ON: HCPCS | Performed by: FAMILY MEDICINE

## 2024-06-19 PROCEDURE — 99214 OFFICE O/P EST MOD 30 MIN: CPT | Performed by: FAMILY MEDICINE

## 2024-06-19 RX ORDER — METFORMIN HCL 500 MG
500 TABLET, EXTENDED RELEASE 24 HR ORAL 2 TIMES DAILY WITH MEALS
Qty: 180 TABLET | Refills: 3 | Status: SHIPPED | OUTPATIENT
Start: 2024-06-19 | End: 2024-08-27

## 2024-06-19 RX ORDER — OLMESARTAN MEDOXOMIL 20 MG/1
20 TABLET ORAL DAILY
Qty: 90 TABLET | Refills: 3 | Status: SHIPPED | OUTPATIENT
Start: 2024-06-19

## 2024-06-19 NOTE — PATIENT INSTRUCTIONS
Metformin Titration Schedule    Week 1: 500mg with dinner  Week 2: 500mg with breakfast and dinner  Week 3: 500mg with breakfast and 1,000mg with dinner  Week 4: 1,000mg with breakfast and dinner    If you reach a dose where you start having side effects (nausea, diarrhea, abdominal pain, etc), please do NOT increase the dose again until these side effects are gone, as they will get worse if you increase the dose too soon. Stay at your current dose until side effects are absent, then move on to the next week.     As always, call the clinic with questions.

## 2024-06-19 NOTE — PROGRESS NOTES
"PRIMARY CARE WEIGHT MANAGEMENT PROGRESS NOTE    CHIEF COMPLAINT:  Dinorah Romero is a 38 year old female who is following up for medical weight management.     Weight has been a thing 'my whole life'. Started around middle school. Has fluctuated between 175 lb to 125 lb. \"Up and down and back and forth\". Soccer until pregancy at age 17. 60 lb weight gain with first pregnancy. Weight gain with each subsequent pregnancy.     Feels more comfortable around lower 130 lbs. Most success with medications for weight loss : Contrave (no effect), phentermine (good effect, had to stop for some time due to elevated blood pressure : now controlled), Saxsenda (stopped Jan 2024 : achieved 150 lbs), Wegovy, Topamax (no effect).    Medications:   Injectable meds for weight loss not covered by insurance currently.   Phentermine 37.5 mg daily.     Nutrition:  Balanced.     Behavior:   Prior \"unhealthy relationship\" with working out.    Physical Activity:   4-5 times a week : walking, playing ball with kids, yard work, roselyn.    WEIGHT HISTORY:   Wt Readings from Last 3 Encounters:   06/19/24 74.2 kg (163 lb 9.6 oz)   04/25/24 76.6 kg (168 lb 12.8 oz)   03/13/24 74.5 kg (164 lb 3.2 oz)       PHYSICAL EXAM:  VITAL SIGNS:  /67   Pulse 89   Ht 1.6 m (5' 3\")   Wt 74.2 kg (163 lb 9.6 oz)   SpO2 96%   BMI 28.98 kg/m        Wt Readings from Last 3 Encounters:   06/19/24 74.2 kg (163 lb 9.6 oz)   04/25/24 76.6 kg (168 lb 12.8 oz)   03/13/24 74.5 kg (164 lb 3.2 oz)       General: no acute distress, cooperative with exam.  Psych: mental status normal, mood and affect appropriate.        ASSESSMENT/PLAN:  Dinorah Romero is a 38 year old female who is following up for medical weight management.    Class 1 obesity due to excess calories with serious comorbidity and body mass index (BMI) of 32.0 to 32.9 in adult    Plan for management includes the following:    -Nutrition: balanced.    -Exercise: Area for future discussion. "    -Medications: Focus of discussion today. Has tried most of the weight loss meds available in the past. Physiology would most likely respond to GLP 1/GIP, but not currently covered by insurance and cost prohibitive OOP compounded options. Revisit if insurance covers in future.   For now, continue phentermine and add in Metformin for increase insulin sensitivity.        Body composition analysis completed today. Spent time discussing and interpreting reading. Good  functional skeletal muscle mass. Incrased visceral adiposity.   -consider repeating composition analysis no sooner than 3 months time.     -     NM OSCAR WHOLE BODY COMPOSITION ASSESSMENT W/I&R  -     metFORMIN (GLUCOPHAGE XR) 500 MG 24 hr tablet; Take 1 tablet (500 mg) by mouth 2 times daily (with meals)    Benign essential hypertension  Switch from BB (can be obesogenic) to first line antihypertensive   BMP within normal limits Feb 2024  Recheck blood pressure and BMP 1 month  -     olmesartan (BENICAR) 20 MG tablet; Take 1 tablet (20 mg) by mouth daily      Follow up:  1 month : blood pressure on ARB, BMP and weight.

## 2024-07-31 ENCOUNTER — OFFICE VISIT (OUTPATIENT)
Dept: FAMILY MEDICINE | Facility: CLINIC | Age: 39
End: 2024-07-31

## 2024-07-31 VITALS
SYSTOLIC BLOOD PRESSURE: 135 MMHG | HEART RATE: 94 BPM | DIASTOLIC BLOOD PRESSURE: 69 MMHG | WEIGHT: 169.2 LBS | OXYGEN SATURATION: 98 % | BODY MASS INDEX: 29.97 KG/M2

## 2024-07-31 DIAGNOSIS — E66.3 OVERWEIGHT (BMI 25.0-29.9): ICD-10-CM

## 2024-07-31 DIAGNOSIS — I10 BENIGN ESSENTIAL HYPERTENSION: Primary | ICD-10-CM

## 2024-07-31 DIAGNOSIS — Z86.39 H/O: OBESITY: ICD-10-CM

## 2024-07-31 LAB
ANION GAP SERPL CALCULATED.3IONS-SCNC: 10 MMOL/L (ref 7–15)
BUN SERPL-MCNC: 13.1 MG/DL (ref 6–20)
CALCIUM SERPL-MCNC: 9.7 MG/DL (ref 8.8–10.4)
CHLORIDE SERPL-SCNC: 101 MMOL/L (ref 98–107)
CREAT SERPL-MCNC: 0.67 MG/DL (ref 0.51–0.95)
EGFRCR SERPLBLD CKD-EPI 2021: >90 ML/MIN/1.73M2
FASTING STATUS PATIENT QL REPORTED: NO
GLUCOSE SERPL-MCNC: 90 MG/DL (ref 70–99)
HCO3 SERPL-SCNC: 24 MMOL/L (ref 22–29)
POTASSIUM SERPL-SCNC: 4.5 MMOL/L (ref 3.4–5.3)
SODIUM SERPL-SCNC: 135 MMOL/L (ref 135–145)

## 2024-07-31 PROCEDURE — 36415 COLL VENOUS BLD VENIPUNCTURE: CPT | Performed by: FAMILY MEDICINE

## 2024-07-31 PROCEDURE — G2211 COMPLEX E/M VISIT ADD ON: HCPCS | Performed by: FAMILY MEDICINE

## 2024-07-31 PROCEDURE — 99214 OFFICE O/P EST MOD 30 MIN: CPT | Performed by: FAMILY MEDICINE

## 2024-07-31 PROCEDURE — 82374 ASSAY BLOOD CARBON DIOXIDE: CPT | Mod: ORL | Performed by: FAMILY MEDICINE

## 2024-07-31 NOTE — PROGRESS NOTES
"PRIMARY CARE WEIGHT MANAGEMENT PROGRESS NOTE    CHIEF COMPLAINT:  Dinorah Romero is a 38 year old female who is following up for medical weight management.     Was on Saxenda (174 lbs ->164 lbs with top dose for 12 months with plateau of weight loss), but insurance no longer covers. Has tried many meds in the past. 174 -175 lbs is set point body weight without medication. Is able to lose weight only with obsessive working out 2 hrs per day, calorie restricting and prior meds.     Feels more comfortable around lower 130 lbs. Most success with medications for weight loss : Contrave (no effect), phentermine (good effect, had to stop for some time due to elevated blood pressure : now controlled), Saxsenda (stopped Jan 2024 : achieved 150 lbs), Wegovy, Topamax (no effect).    Medications:   Injectable meds for weight loss not covered by insurance currently/ compounding OOP cost prohibitive.   Phentermine 37.5 mg daily: self guided cessation due to elevated blood pressure readings.   Metformin added at last appointment : unsure if having effect.     Nutrition:  Balanced.     Behavior:   Prior \"unhealthy relationship\" with working out.    Physical Activity:   4-5 times a week : walking, playing ball with kids, yard work, roselyn.    WEIGHT HISTORY:   Wt Readings from Last 3 Encounters:   07/31/24 76.7 kg (169 lb 3.2 oz)   06/19/24 74.2 kg (163 lb 9.6 oz)   04/25/24 76.6 kg (168 lb 12.8 oz)       PHYSICAL EXAM:  VITAL SIGNS:  /69   Pulse 94   Wt 76.7 kg (169 lb 3.2 oz)   SpO2 98%   BMI 29.97 kg/m        Wt Readings from Last 3 Encounters:   07/31/24 76.7 kg (169 lb 3.2 oz)   06/19/24 74.2 kg (163 lb 9.6 oz)   04/25/24 76.6 kg (168 lb 12.8 oz)       General: no acute distress, cooperative with exam.  Psych: mental status normal, mood and affect appropriate.      ASSESSMENT/PLAN:  Dinorah Romero is a 38 year old female who is following up for medical weight management.    Benign essential hypertension  At target " of <140/90.  Continue olmesartan  20 mg daily.  -BMP after ARB start      Overweight (BMI 25.0-29.9)  H/O: obesity  Plan for management includes the following:    -Nutrition: balanced.    -Exercise: good levels.    -Medications:    Has tried most of the weight loss meds available in the past.   Most recently Phentermine 37.5 mg daily: self guided cessation due to elevated home blood pressure readings.   GLP 1/GIP not currently covered by insurance and cost prohibitive OOP compounded options in the long term. Feeling frustrated by this.   For now, continue Metformin for increase insulin sensitivity.   -trial of semaglutide (sample issued) 0.25 mg weekly.   -if effective consider compounded until can start on husbands insurance in 4 months (has coverage on anti obesity medications).        Prior body composition analysis : good  functional skeletal muscle mass. Increased visceral adiposity.   Last Body Comp 6/19/24 : repeat no sooner than 3 months.       Follow up:  6 weeks medical weight management     The longitudinal plan of care for the diagnosis(es)/condition(s) as documented were addressed during this visit. Due to the added complexity in care, I will continue to support Dinorah in the subsequent management and with ongoing continuity of care.

## 2024-08-27 ENCOUNTER — MYC MEDICAL ADVICE (OUTPATIENT)
Dept: FAMILY MEDICINE | Facility: CLINIC | Age: 39
End: 2024-08-27

## 2024-08-27 DIAGNOSIS — E66.09 CLASS 1 OBESITY DUE TO EXCESS CALORIES WITH SERIOUS COMORBIDITY AND BODY MASS INDEX (BMI) OF 32.0 TO 32.9 IN ADULT: ICD-10-CM

## 2024-08-27 DIAGNOSIS — E66.811 CLASS 1 OBESITY DUE TO EXCESS CALORIES WITH SERIOUS COMORBIDITY AND BODY MASS INDEX (BMI) OF 32.0 TO 32.9 IN ADULT: ICD-10-CM

## 2024-08-27 RX ORDER — METFORMIN HCL 500 MG
TABLET, EXTENDED RELEASE 24 HR ORAL
Qty: 270 TABLET | Refills: 3 | Status: SHIPPED | OUTPATIENT
Start: 2024-08-27

## 2024-09-11 ENCOUNTER — OFFICE VISIT (OUTPATIENT)
Dept: FAMILY MEDICINE | Facility: CLINIC | Age: 39
End: 2024-09-11

## 2024-09-11 VITALS
OXYGEN SATURATION: 99 % | BODY MASS INDEX: 29.16 KG/M2 | HEART RATE: 85 BPM | WEIGHT: 164.6 LBS | SYSTOLIC BLOOD PRESSURE: 132 MMHG | DIASTOLIC BLOOD PRESSURE: 80 MMHG

## 2024-09-11 DIAGNOSIS — I10 BENIGN ESSENTIAL HYPERTENSION: Primary | ICD-10-CM

## 2024-09-11 DIAGNOSIS — E66.3 OVERWEIGHT (BMI 25.0-29.9): ICD-10-CM

## 2024-09-11 DIAGNOSIS — Z86.39 H/O: OBESITY: ICD-10-CM

## 2024-09-11 PROCEDURE — 99401 PREV MED CNSL INDIV APPRX 15: CPT | Performed by: FAMILY MEDICINE

## 2024-09-11 PROCEDURE — 99214 OFFICE O/P EST MOD 30 MIN: CPT | Mod: 25 | Performed by: FAMILY MEDICINE

## 2024-09-11 NOTE — PROGRESS NOTES
"PRIMARY CARE WEIGHT MANAGEMENT PROGRESS NOTE    CHIEF COMPLAINT:  Dinorah Romero is a 38 year old female who is following up for medical weight management.     Was on Saxenda (174 lbs ->164 lbs with top dose for 12 months with plateau of weight loss), but insurance no longer covers. Has tried many meds in the past. 174 -175 lbs is set point body weight without medication. Is able to lose weight only with obsessive working out 2 hrs per day, calorie restricting and prior meds.     Feels more comfortable around lower 130 lbs. Most success with medications for weight loss : Contrave (no effect), phentermine (good effect, had to stop for some time due to elevated blood pressure : now controlled), Saxsenda (stopped Jan 2024 : achieved 150 lbs), Wegovy, Topamax (no effect).    Global skin sensitivity with 0.5mg of Ozempic (no such effect with 0.25mg : can achieve weight maintenance at this dose, but not weight loss)  Similar skin sensitivity effect with 0.25mg of Wegovy.   No such sensitivity with Saxsenda.     Medications:   Ozempic 0.25 mg weekly : paying out of pocket : anticipates will be covered once on husbands insurance in Jan 2025. Unable to go higher due to skin reaction  Metformin 500 mg am, 1000 mg pm    Most recently:  Phentermine 37.5 mg daily: self guided cessation due to elevated blood pressure readings.       Nutrition:  Balanced.     Behavior:   Prior \"unhealthy relationship\" with working out.    Physical Activity:   4-5 times a week : walking, playing ball with kids, yard work, roselyn.    WEIGHT HISTORY:   Wt Readings from Last 3 Encounters:   09/11/24 74.7 kg (164 lb 9.6 oz)   07/31/24 76.7 kg (169 lb 3.2 oz)   06/19/24 74.2 kg (163 lb 9.6 oz)       PHYSICAL EXAM:  VITAL SIGNS:  /80   Pulse 85   Wt 74.7 kg (164 lb 9.6 oz)   SpO2 99%   BMI 29.16 kg/m      -3 lbs since last appointment.     Wt Readings from Last 3 Encounters:   09/11/24 74.7 kg (164 lb 9.6 oz)   07/31/24 76.7 kg (169 lb 3.2 " oz)   06/19/24 74.2 kg (163 lb 9.6 oz)       General: no acute distress, cooperative with exam.  Psych: mental status normal, mood and affect appropriate.      ASSESSMENT/PLAN:  Dinorah Romero is a 38 year old female who is following up for medical weight management.    Benign essential hypertension  At target of <140/90.  Continue olmesartan  20 mg daily.      Overweight (BMI 25.0-29.9)  BMI 29.0-29.9,adult  H/O: obesity  Plan for management includes the following:    -Nutrition: balanced.    -Exercise: good levels.    -Medications:    Has tried most of the weight loss meds available in the past.     GLP 1/GIP not currently covered by insurance and cost prohibitive OOP compounded options in the long term.   For now, continue Metformin for increase insulin sensitivity.   -Unable to go higher on semaglutide than 0.25 mg weekly : due to skin sensitivity.   -stop semaglutide and trial tirzepatide.      Prior body composition analysis : good  functional skeletal muscle mass. Increased visceral adiposity.   Last Body Comp 6/19/24 : repeat no sooner than 3 months.     -tirzepatide-Weight Management (ZEPBOUND) 2.5 MG/0.5ML prefilled pen   -     PREVENT ,INDIV,15 MIN    Follow up:  4 weeks medical weight management     The longitudinal plan of care for the diagnosis(es)/condition(s) as documented were addressed during this visit. Due to the added complexity in care, I will continue to support Dinorah in the subsequent management and with ongoing continuity of care.

## 2024-10-09 ENCOUNTER — OFFICE VISIT (OUTPATIENT)
Dept: FAMILY MEDICINE | Facility: CLINIC | Age: 39
End: 2024-10-09

## 2024-10-09 VITALS
DIASTOLIC BLOOD PRESSURE: 79 MMHG | WEIGHT: 161.2 LBS | SYSTOLIC BLOOD PRESSURE: 129 MMHG | OXYGEN SATURATION: 99 % | BODY MASS INDEX: 28.56 KG/M2 | HEART RATE: 91 BPM

## 2024-10-09 DIAGNOSIS — I10 BENIGN ESSENTIAL HYPERTENSION: Primary | ICD-10-CM

## 2024-10-09 DIAGNOSIS — E66.3 OVERWEIGHT (BMI 25.0-29.9): ICD-10-CM

## 2024-10-09 DIAGNOSIS — Z86.39 H/O: OBESITY: ICD-10-CM

## 2024-10-09 PROCEDURE — 0358T BIA WHOLE BODY: CPT | Mod: 59 | Performed by: FAMILY MEDICINE

## 2024-10-09 PROCEDURE — 99401 PREV MED CNSL INDIV APPRX 15: CPT | Performed by: FAMILY MEDICINE

## 2024-10-09 PROCEDURE — 99214 OFFICE O/P EST MOD 30 MIN: CPT | Mod: 25 | Performed by: FAMILY MEDICINE

## 2024-10-09 NOTE — PROGRESS NOTES
"PRIMARY CARE WEIGHT MANAGEMENT PROGRESS NOTE    CHIEF COMPLAINT:  Dinorah Romero is a 38 year old female who is following up for medical weight management.     Was on Saxenda (174 lbs ->164 lbs with top dose for 12 months with plateau of weight loss), but insurance no longer covers. Has tried many meds in the past. 174 -175 lbs is set point body weight without medication. Is able to lose weight only with obsessive working out 2 hrs per day, calorie restricting and prior meds.     Feels more comfortable around lower 130 lbs. Most success with medications for weight loss : Contrave (no effect), phentermine (good effect, had to stop for some time due to elevated blood pressure : now controlled), Saxsenda (stopped Jan 2024 : achieved 150 lbs), Wegovy, Topamax (no effect).    Global skin sensitivity with 0.5mg of Ozempic (no such effect with 0.25mg : can achieve weight maintenance at this dose, but not weight loss)  Similar skin sensitivity effect with 0.25mg of Wegovy.   No such sensitivity with Saxsenda.     Medications:   -Zepbound 2.5 mg weekly : increased satiety and reduced portion sizes. Tolerating well. Feels good fit medication. Ancipitates will be covered once on husbands insurance in Jan 2025.    -Metformin 500 mg am, 1000 mg pm    Nutrition:  Balanced.     Behavior:   Prior \"unhealthy relationship\" with working out. Feels in a better place now.     Physical Activity:   4-5 times a week : walking, playing ball with kids, yard work, roselyn. Walking at work.     WEIGHT HISTORY:   Wt Readings from Last 3 Encounters:   10/09/24 73.1 kg (161 lb 3.2 oz)   09/11/24 74.7 kg (164 lb 9.6 oz)   07/31/24 76.7 kg (169 lb 3.2 oz)       PHYSICAL EXAM:  VITAL SIGNS:  /79   Pulse 91   Wt 73.1 kg (161 lb 3.2 oz)   SpO2 99%   BMI 28.56 kg/m      -3 lbs weight loss since last appointment    Wt Readings from Last 3 Encounters:   10/09/24 73.1 kg (161 lb 3.2 oz)   09/11/24 74.7 kg (164 lb 9.6 oz)   07/31/24 76.7 kg (169 " lb 3.2 oz)       General: no acute distress, cooperative with exam.  Psych: mental status normal, mood and affect appropriate.        ASSESSMENT/PLAN:  Dinorah Romero is a 38 year old female who is following up for medical weight management.    Benign essential hypertension  At target of <140/90.  Continue olmesartan  20 mg daily.    Overweight (BMI 25.0-29.9)  BMI 28.0-28.9,adult  H/O: obesity  Plan for management includes the following:    -Nutrition: balanced.    -Exercise: good levels: discussed adding in strength training in light of muscle mass loss on body comp : plans to do this guided by son who strength trains regularly.     -Medications:     continue Metformin for increase insulin sensitivity.   Tirzepatide 0.25 mg weekly       -tirzepatide-Weight Management (ZEPBOUND) 2.5 MG/0.5ML prefilled pen   -[place] OSCAR WHOLE BODY COMPOSITION ASSESSMENT W/I&R   -     PREVENT ,INDIV,15 MIN    Follow up:  4 weeks medical weight management     The longitudinal plan of care for the diagnosis(es)/condition(s) as documented were addressed during this visit. Due to the added complexity in care, I will continue to support Dinorah in the subsequent management and with ongoing continuity of care.

## 2024-11-06 ENCOUNTER — OFFICE VISIT (OUTPATIENT)
Dept: FAMILY MEDICINE | Facility: CLINIC | Age: 39
End: 2024-11-06

## 2024-11-06 VITALS
WEIGHT: 158.8 LBS | SYSTOLIC BLOOD PRESSURE: 137 MMHG | BODY MASS INDEX: 28.13 KG/M2 | OXYGEN SATURATION: 99 % | HEART RATE: 94 BPM | DIASTOLIC BLOOD PRESSURE: 79 MMHG

## 2024-11-06 DIAGNOSIS — Z86.39 H/O: OBESITY: ICD-10-CM

## 2024-11-06 DIAGNOSIS — E66.3 OVERWEIGHT (BMI 25.0-29.9): ICD-10-CM

## 2024-11-06 DIAGNOSIS — I10 BENIGN ESSENTIAL HYPERTENSION: Primary | ICD-10-CM

## 2024-11-06 PROCEDURE — 99401 PREV MED CNSL INDIV APPRX 15: CPT | Performed by: FAMILY MEDICINE

## 2024-11-06 PROCEDURE — 99214 OFFICE O/P EST MOD 30 MIN: CPT | Mod: 25 | Performed by: FAMILY MEDICINE

## 2024-11-06 NOTE — PROGRESS NOTES
"PRIMARY CARE WEIGHT MANAGEMENT PROGRESS NOTE    CHIEF COMPLAINT:  Dinorah Romero is a 38 year old female who is following up for medical weight management.     Was on Saxenda (174 lbs ->164 lbs with top dose for 12 months with plateau of weight loss), but insurance no longer covers. Has tried many meds in the past. 174 -175 lbs is set point body weight without medication. Is able to lose weight only with obsessive working out 2 hrs per day, calorie restricting and prior meds.     Feels more comfortable around lower 130 lbs. Most success with medications for weight loss : Contrave (no effect), phentermine (good effect, had to stop for some time due to elevated blood pressure : now controlled), Saxsenda (stopped Jan 2024 : achieved 150 lbs), Wegovy, Topamax (no effect).    Global skin sensitivity with 0.5mg of Ozempic (no such effect with 0.25mg : can achieve weight maintenance at this dose, but not weight loss)  Similar skin sensitivity effect with 0.25mg of Wegovy.   No such sensitivity with Saxsenda.     Medications:   -Zepbound 2.5 mg weekly : increased satiety and reduced portion sizes. Tolerating well. Feels good fit medication. Ancipitates will be covered once on husbands insurance in Jan 2025.    -Metformin 500 mg am, 1000 mg pm.    Nutrition:  Balanced.     Behavior:   Prior \"unhealthy relationship\" with working out. Feels in a better place now.     Physical Activity:   4-5 times a week : walking, playing ball with kids, yard work, roselyn. Walking at work (inside now winter). Weight lifting with son.     WEIGHT HISTORY:   Wt Readings from Last 3 Encounters:   11/06/24 72 kg (158 lb 12.8 oz)   10/09/24 73.1 kg (161 lb 3.2 oz)   09/11/24 74.7 kg (164 lb 9.6 oz)       PHYSICAL EXAM:  VITAL SIGNS:  /79   Pulse 94   Wt 72 kg (158 lb 12.8 oz)   SpO2 99%   BMI 28.13 kg/m      -3 lbs weight loss since last appointment    Wt Readings from Last 3 Encounters:   11/06/24 72 kg (158 lb 12.8 oz)   10/09/24 73.1 " kg (161 lb 3.2 oz)   09/11/24 74.7 kg (164 lb 9.6 oz)       General: no acute distress, cooperative with exam.  Psych: mental status normal, mood and affect appropriate.      ASSESSMENT/PLAN:  Dinorah Romero is a 38 year old female who is following up for medical weight management.    Benign essential hypertension  At target of <140/90.  Continue olmesartan  20 mg daily.    Overweight (BMI 25.0-29.9)  BMI 28.0-28.9,adult  H/O: obesity  Plan for management includes the following:    -Nutrition: balanced.    -Exercise: good levels: continue to focus on strength training in light of muscle mass loss on prior body comp : with son who strength trains regularly.     -Medications:   continue Metformin for increase insulin sensitivity.   Tirzepatide 0.25 mg weekly     -tirzepatide-Weight Management (ZEPBOUND) 2.5 MG/0.5ML prefilled pen   -     PREVENT ,INDIV,15 MIN    Follow up:  8 weeks medical weight management     The longitudinal plan of care for the diagnosis(es)/condition(s) as documented were addressed during this visit. Due to the added complexity in care, I will continue to support Dinorah in the subsequent management and with ongoing continuity of care.

## 2024-11-14 ENCOUNTER — OFFICE VISIT (OUTPATIENT)
Dept: OBGYN | Facility: CLINIC | Age: 39
End: 2024-11-14
Payer: COMMERCIAL

## 2024-11-14 VITALS — BODY MASS INDEX: 27.99 KG/M2 | DIASTOLIC BLOOD PRESSURE: 76 MMHG | SYSTOLIC BLOOD PRESSURE: 118 MMHG | WEIGHT: 158 LBS

## 2024-11-14 DIAGNOSIS — Z30.41 ENCOUNTER FOR SURVEILLANCE OF CONTRACEPTIVE PILLS: Primary | ICD-10-CM

## 2024-11-14 DIAGNOSIS — Z30.017 NEXPLANON INSERTION: ICD-10-CM

## 2024-11-14 PROCEDURE — 99213 OFFICE O/P EST LOW 20 MIN: CPT | Mod: 25 | Performed by: FAMILY MEDICINE

## 2024-11-14 PROCEDURE — 11983 REMOVE/INSERT DRUG IMPLANT: CPT | Performed by: FAMILY MEDICINE

## 2024-11-14 RX ORDER — AZITHROMYCIN 250 MG/1
TABLET, FILM COATED ORAL DAILY
COMMUNITY

## 2024-11-14 NOTE — NURSING NOTE
"Chief Complaint   Patient presents with    Nexplanon     Removal of Nexplanon and insertion of a new Nexplanon     initial /76  Estimated body mass index is 28.13 kg/m  as calculated from the following:    Height as of 6/19/24: 1.6 m (5' 3\").    Weight as of 11/6/24: 72 kg (158 lb 12.8 oz).  BP completed using cuff size regular.  Ingrid Ordonez CMA    "

## 2024-11-14 NOTE — PATIENT INSTRUCTIONS
Weight loss:    Topamax   Welbutrin   Phentermine     Calorie deficit     protein to help with fullness-satiety.      Lu Burris DO on 11/14/2024 at 1:38 PM

## 2024-11-14 NOTE — PROGRESS NOTES
SUBJECTIVE:  Dinorah Romero is an 39 year old   woman who presents for   gynecology consult for Nexplanon removal and replacement.  On Oral Contraceptive to control periods    No LMP recorded. (Menstrual status: Birth Control). Periods are not occuring, lasting   4 days. Menarche @ age teenager, Dysmenorrhea:mild, occurring premenstrually and first 1-2 days of flow. Cyclic symptoms   include none. No intermenstrual bleeding,   spotting, or discharge.    Current contraception: Nexplanon, Oral Contraceptive   History of abnormal Pap smear: No  Family history of uterine or ovarian cancer: No  History of abnormal mammogram: No  Family history of breast cancer: No        Past Medical History:   Diagnosis Date    ALLERGIC RHINITIS     Benign essential hypertension 2022    Closed fracture of unspecified part of humerus     Left    DEPRESSIVE DISORDER     Depressive disorder     Have had dx of depression multiple times simce adolescence    Tobacco use disorder     Unspecified closed fracture of ankle     Ankle fracture          Family History   Problem Relation Age of Onset    Arthritis Mother     Depression Mother     Cerebrovascular Disease Father     Alcohol/Drug Father     Depression Father     Lipids Father     Depression Sister     Alcohol/Drug Brother     Depression Brother     Alcohol/Drug Brother     Alzheimer Disease Maternal Grandmother     Hypertension Paternal Grandmother     No Known Problems Daughter     Colon Cancer Other     Breast Cancer No family hx of        Past Surgical History:   Procedure Laterality Date    ENT SURGERY  2016    tonsills    EXCISE LESION TRUNK N/A 2016    Procedure: EXCISE LESION TRUNK;  Surgeon: Kris Younger MD;  Location:  OR       Current Outpatient Medications   Medication Sig Dispense Refill    azithromycin (ZITHROMAX) 250 MG tablet Take by mouth daily.      etonogestrel (NEXPLANON) 68 MG IMPL 1 each (68 mg) by Subdermal route once       metFORMIN (GLUCOPHAGE XR) 500 MG 24 hr tablet Take 500mg with breakfast and 1000mg with dinner 270 tablet 3    Multiple Vitamins-Minerals (MULTI ADULT GUMMIES PO) Take 1 chew tab by mouth three times a week      olmesartan (BENICAR) 20 MG tablet Take 1 tablet (20 mg) by mouth daily 90 tablet 3    tirzepatide-Weight Management (ZEPBOUND) 2.5 MG/0.5ML prefilled pen Inject 0.5 mLs (2.5 mg) subcutaneously every 7 days. Lot : [id number]  Exp : 2025-sep-21      LORazepam (ATIVAN) 0.5 MG tablet One daily if needed for anxiety. (Patient not taking: Reported on 2024) 20 tablet 0     No current facility-administered medications for this visit.     Allergies   Allergen Reactions    Cetirizine Hives     Zyrtec    Zyrtec [Cetirizine Hcl] Rash       Social History     Tobacco Use    Smoking status: Former     Current packs/day: 0.00     Types: Cigarettes     Start date: 10/22/2010     Quit date: 10/22/2011     Years since quittin.0    Smokeless tobacco: Never    Tobacco comments:     1 pack every 2.5 days   Substance Use Topics    Alcohol use: Yes     Comment: Limited use 2x s mnth glass of wine or 2       Review Of Systems  Ears/Nose/Throat: negative  Respiratory: No shortness of breath, dyspnea on exertion, cough, or hemoptysis  Cardiovascular: negative  Gastrointestinal: negative  Genitourinary: See HPI   Constitutional, HEENT, cardiovascular, pulmonary, GI, , musculoskeletal, neuro, skin, endocrine and psych systems are negative, except as otherwise noted.    OBJECTIVE:  /76   Wt 71.7 kg (158 lb)   BMI 27.99 kg/m    General appearance: healthy, alert, and no distress  Skin: Skin color, texture, turgor normal. No rashes or lesions.  Ears: negative  Nose/Sinuses: Nares normal. Septum midline. Mucosa normal. No drainage or sinus tenderness.  Oropharynx: Lips, mucosa, and tongue normal. Teeth and gums normal.  Neck: Neck supple. No adenopathy. Thyroid symmetric, normal size,, Carotids without bruits.  Lungs:  negative, Percussion normal. Good diaphragmatic excursion. Lungs clear  Heart: negative, PMI normal. No lifts, heaves, or thrills. RRR. No murmurs, clicks gallops or rub    Procedure note:      The Implanon/Nexplanon rita was located in the left arm and the area cleansed with betadine.  1% lidocaine with epinephrine was injected into the area and a small skin incision made using a scalpel.  The Implanon/Nexplanon was gently manipulated until the tip was at the incision.  A Bella clamp was used to remove the Nexplanon completely intact.  A small stab incision is made through the incision for removal. The Nexplanon device is then placed just under the skin with care not to go too deep.  The device is then slowly removed, leaving the rita behind.  The rita is palpable in the appropriate place under the skin.  The incision is noted to be hemostatic and the area is wrapped with a steristrips and tape.      There were no complications and minimal blood loss.    The incision site was hemostatic and a steri-strip applied to the area.    Dr. Lu Burris, DO    Obstetrics and Gynecology  [facility]         ASSESSMENT:  Dinorah Romero is an 39 year old   woman who presents for   gynecology consult for Nexplanon removal and replacement.  On Oral Contraceptive to control periods      PLAN:  Dx:  1)  nexplanon removal and replacement  2)  continue Oral Contraceptive   3)   Menorrhagia: previously discussed possible treatment options includin OCP's, Nuvaring, patch, Mirena IUD, Novasure endometrial ablation and hysterectomy.  Recommend pelvic ultrasound and endometrial sampling, discussed endometrial biopsy and dilation and curettage.      She is considering novasure ablation vs hysterectomy, but will continue with   Hormonal control as above.     4)  BMI 27:  was on saxenda, and saw weight loss.  Working with health clinic.          Labs were reviewed in [email or web address]   Imaging was reviewed in  [facility]   Tests and documents were reviewed.   Discussion of management or test interpretation   Diagnosis or treatment significantly limited by social determinant       Dr. Lu Burris,     Obstetrics and Gynecology  [facility] [place]

## 2025-01-08 ENCOUNTER — OFFICE VISIT (OUTPATIENT)
Dept: FAMILY MEDICINE | Facility: CLINIC | Age: 40
End: 2025-01-08

## 2025-01-08 VITALS
DIASTOLIC BLOOD PRESSURE: 72 MMHG | SYSTOLIC BLOOD PRESSURE: 135 MMHG | WEIGHT: 153.8 LBS | BODY MASS INDEX: 28.3 KG/M2 | HEIGHT: 62 IN | OXYGEN SATURATION: 100 % | HEART RATE: 82 BPM

## 2025-01-08 DIAGNOSIS — Z86.39 H/O: OBESITY: ICD-10-CM

## 2025-01-08 DIAGNOSIS — E66.3 OVERWEIGHT (BMI 25.0-29.9): Primary | ICD-10-CM

## 2025-01-08 DIAGNOSIS — I10 BENIGN ESSENTIAL HYPERTENSION: ICD-10-CM

## 2025-01-08 PROCEDURE — 99401 PREV MED CNSL INDIV APPRX 15: CPT | Performed by: FAMILY MEDICINE

## 2025-01-08 PROCEDURE — 99214 OFFICE O/P EST MOD 30 MIN: CPT | Mod: 25 | Performed by: FAMILY MEDICINE

## 2025-01-08 PROCEDURE — 0358T BIA WHOLE BODY: CPT | Mod: 59 | Performed by: FAMILY MEDICINE

## 2025-01-08 RX ORDER — TIRZEPATIDE 5 MG/.5ML
5 INJECTION, SOLUTION SUBCUTANEOUS
Qty: 6 ML | Refills: 0 | Status: SHIPPED | OUTPATIENT
Start: 2025-01-08 | End: 2025-04-08

## 2025-01-08 NOTE — PROGRESS NOTES
"PRIMARY CARE WEIGHT MANAGEMENT PROGRESS NOTE    CHIEF COMPLAINT:  Dinorah Romero is a 38 year old female who is following up for medical weight management.     Was on Saxenda (174 lbs ->164 lbs with top dose for 12 months with plateau of weight loss), but insurance no longer covers. Has tried many meds in the past. 174 -175 lbs is set point body weight without medication. Is able to lose weight only with obsessive working out 2 hrs per day, calorie restricting and prior meds.     Feels more comfortable around lower 130 lbs. Most success with medications for weight loss : Contrave (no effect), phentermine (good effect, had to stop for some time due to elevated blood pressure : now controlled), Saxsenda (stopped Jan 2024 : achieved 150 lbs), Wegovy, Topamax (no effect).    Global skin sensitivity with 0.5mg of Ozempic (no such effect with 0.25mg : can achieve weight maintenance at this dose, but not weight loss)  Similar skin sensitivity effect with 0.25mg of Wegovy.   No such sensitivity with Saxsenda.     Medications:   -Zepbound 2.5 mg weekly :wearing off of effect.  Did anticipate will be covered once on husbands insurance in Jan 2025, but ultimately opted to stay on own insurance.     -Metformin 500 mg am, 1000 mg pm.    Nutrition:  Balanced.     Behavior:   Prior \"unhealthy relationship\" with working out. Feels in a better place now.     Physical Activity:   4-5 times a week : walking, playing ball with kids, yard work, roselyn. Walking at work (inside now winter). Weight lifting with son (less so over holiday season);.    Thinking about joining [facility] for use of weight machines.     WEIGHT HISTORY:   Wt Readings from Last 3 Encounters:   01/08/25 69.8 kg (153 lb 12.8 oz)   11/14/24 71.7 kg (158 lb)   11/06/24 72 kg (158 lb 12.8 oz)       PHYSICAL EXAM:  VITAL SIGNS:  /72   Pulse 82   Ht 1.575 m (5' 2\")   Wt 69.8 kg (153 lb 12.8 oz)   SpO2 100%   BMI 28.13 kg/m      -3 lbs weight loss since last " appointment    Wt Readings from Last 3 Encounters:   01/08/25 69.8 kg (153 lb 12.8 oz)   11/14/24 71.7 kg (158 lb)   11/06/24 72 kg (158 lb 12.8 oz)       General: no acute distress, cooperative with exam.  Psych: mental status normal, mood and affect appropriate.        ASSESSMENT/PLAN:  Dinorah Romero is a 38 year old female who is following up for medical weight management.    Benign essential hypertension  At target of <140/90.  Continue olmesartan  20 mg daily.    Overweight (BMI 25.0-29.9)  BMI 28.0-28.9,adult  H/O: obesity  Plan for management includes the following:    -Nutrition: balanced.    -Exercise: good levels: continue to focus on strength training in light of muscle mass loss on prior body comp : with son who strength trains regularly and at [facility].      -Medications:   continue Metformin for increase insulin sensitivity.   Increase Tirzepatide to 0.5 mg weekly : intends to use coupon and pay out of pocket.     -tirzepatide-Weight Management (ZEPBOUND) 5 MG/0.5ML prefilled pen   -     PREVENT ,INDIV,15 MIN  -     [place] OSCAR WHOLE BODY COMPOSITION ASSESSMENT W/I&R          Follow up:  12 weeks medical weight management     The longitudinal plan of care for the diagnosis(es)/condition(s) as documented were addressed during this visit. Due to the added complexity in care, I will continue to support Dinorah in the subsequent management and with ongoing continuity of care.

## 2025-04-08 RX ORDER — TIRZEPATIDE 5 MG/.5ML
5 INJECTION, SOLUTION SUBCUTANEOUS
Qty: 6 ML | Refills: 0 | Status: CANCELLED | OUTPATIENT
Start: 2025-04-08

## 2025-04-09 ENCOUNTER — OFFICE VISIT (OUTPATIENT)
Dept: FAMILY MEDICINE | Facility: CLINIC | Age: 40
End: 2025-04-09

## 2025-04-09 VITALS
BODY MASS INDEX: 28.9 KG/M2 | DIASTOLIC BLOOD PRESSURE: 55 MMHG | WEIGHT: 158 LBS | HEART RATE: 80 BPM | OXYGEN SATURATION: 99 % | SYSTOLIC BLOOD PRESSURE: 127 MMHG

## 2025-04-09 DIAGNOSIS — I10 BENIGN ESSENTIAL HYPERTENSION: Primary | ICD-10-CM

## 2025-04-09 DIAGNOSIS — Z86.39 H/O: OBESITY: ICD-10-CM

## 2025-04-09 PROCEDURE — 3074F SYST BP LT 130 MM HG: CPT | Performed by: FAMILY MEDICINE

## 2025-04-09 PROCEDURE — G2211 COMPLEX E/M VISIT ADD ON: HCPCS | Performed by: FAMILY MEDICINE

## 2025-04-09 PROCEDURE — 99214 OFFICE O/P EST MOD 30 MIN: CPT | Performed by: FAMILY MEDICINE

## 2025-04-09 PROCEDURE — 3078F DIAST BP <80 MM HG: CPT | Performed by: FAMILY MEDICINE

## 2025-04-09 NOTE — PROGRESS NOTES
"PRIMARY CARE WEIGHT MANAGEMENT PROGRESS NOTE    CHIEF COMPLAINT:  Dinorah Romero is a 38 year old female who is following up for medical weight management.     Was on Saxenda (174 lbs ->164 lbs with top dose for 12 months with plateau of weight loss), but insurance no longer covers. Has tried many meds in the past. 174 -175 lbs is set point body weight without medication. Is able to lose weight only with obsessive working out 2 hrs per day, calorie restricting and prior meds.     Feels more comfortable around lower 130 lbs. Most success with medications for weight loss : Contrave (no effect), phentermine (good effect, had to stop for some time due to elevated blood pressure : now controlled), Saxsenda (stopped Jan 2024 : achieved 150 lbs), Wegovy, Topamax (no effect).    Global skin sensitivity with 0.5mg of Ozempic (no such effect with 0.25mg : can achieve weight maintenance at this dose, but not weight loss)  Similar skin sensitivity effect with 0.25mg of Wegovy.   No such sensitivity with Saxsenda.     Medications:   -Zepbound 0.5 mg weekly. Has been out for last 2 months : awaiting PA for denial so can access coupon cost.   -Metformin 500 mg am, 1000 mg pm.    Nutrition:  Balanced.     Behavior:   Prior \"unhealthy relationship\" with working out. Feels in a better place now.     Physical Activity:   4-5 times a week : walking, playing ball with kids, yard work, roselyn. Walking outside now with improved weather. Weight lifting with son.    WEIGHT HISTORY:   Wt Readings from Last 3 Encounters:   04/09/25 71.7 kg (158 lb)   01/08/25 69.8 kg (153 lb 12.8 oz)   11/14/24 71.7 kg (158 lb)       PHYSICAL EXAM:  VITAL SIGNS:  /55   Pulse 80   Wt 71.7 kg (158 lb)   SpO2 99%   BMI 28.90 kg/m      +5 lbs weight gain since last appointment    Wt Readings from Last 3 Encounters:   04/09/25 71.7 kg (158 lb)   01/08/25 69.8 kg (153 lb 12.8 oz)   11/14/24 71.7 kg (158 lb)     General: no acute distress, cooperative " with exam.  Psych: mental status normal, mood and affect appropriate.        ASSESSMENT/PLAN:  Dinorah Romero is a 38 year old female who is following up for medical weight management.    Benign essential hypertension  At target of <140/90.  Continue olmesartan  20 mg daily.    Overweight  BMI 28.0-28.9,adult  H/O: obesity  Plan for management includes the following:    -Nutrition: balanced.    -Exercise: good levels: continue to focus on strength training in light of muscle mass loss on prior body comp.   -Medications:   continue Metformin for increase insulin sensitivity.   -Zepbound 0.5 mg weekly. Has been out for last 2 months : awaiting PA for denial so can access coupon cost. Some weight regain since being off as expected.     -tirzepatide-Weight Management (ZEPBOUND) 5 MG/0.5ML prefilled pen         Follow up:  12 weeks medical weight management     The longitudinal plan of care for the diagnosis(es)/condition(s) as documented were addressed during this visit. Due to the added complexity in care, I will continue to support Dinorah in the subsequent management and with ongoing continuity of care.

## 2025-04-10 ENCOUNTER — TELEPHONE (OUTPATIENT)
Dept: FAMILY MEDICINE | Facility: CLINIC | Age: 40
End: 2025-04-10

## 2025-04-11 NOTE — TELEPHONE ENCOUNTER
Prior authorization attempted via CoverMyMeds for Zepbound. Medication is excluded from coverage. Renee Archuleta

## 2025-05-02 DIAGNOSIS — E66.09 CLASS 1 OBESITY DUE TO EXCESS CALORIES WITH SERIOUS COMORBIDITY AND BODY MASS INDEX (BMI) OF 32.0 TO 32.9 IN ADULT: ICD-10-CM

## 2025-05-02 DIAGNOSIS — E66.811 CLASS 1 OBESITY DUE TO EXCESS CALORIES WITH SERIOUS COMORBIDITY AND BODY MASS INDEX (BMI) OF 32.0 TO 32.9 IN ADULT: ICD-10-CM

## 2025-05-02 NOTE — TELEPHONE ENCOUNTER
Med: Zepbound 2.5mg weekly **I called and spoke with the patient about the dose. She was at the higher dose, but her insurance will no longer cover it. She is wondering if it is okay to go to the 0.25mg weekly since it is more affordable.    LOV (related): 04/09/2025      Due for F/U around: 07/2025 (3mo MWM f/u)    Next Appt: 08/13/2025

## 2025-06-04 ENCOUNTER — TELEPHONE (OUTPATIENT)
Dept: FAMILY MEDICINE | Facility: CLINIC | Age: 40
End: 2025-06-04

## 2025-06-04 DIAGNOSIS — E66.3 OVERWEIGHT (BMI 25.0-29.9): Primary | ICD-10-CM

## 2025-06-04 NOTE — TELEPHONE ENCOUNTER
Patient calls requesting increase in dose of Zepbound vials to 5mg/week.     Last visit: 4/9/25 weight management med and HTN visit with Dr. Rincon; had been off Zepbound 5mg for couple months due to cost so restarting Zepbound was plan at this visit.    5/9/25 Zepbound 2.5mg/week was sent to [facility] per patient request after she had researched cost options.   Patient reports is tolerating well and would like to increase to 5mg/week.   Future visit: 8/13/25 with Dr. Rincon for 4 month weight check.     Wt Readings from Last 4 Encounters:   04/09/25 71.7 kg (158 lb)   01/08/25 69.8 kg (153 lb 12.8 oz)   11/14/24 71.7 kg (158 lb)   11/06/24 72 kg (158 lb 12.8 oz)      Plan: routed request to Dr. Rincon for review of request.   Anh Franklin RN

## 2025-06-05 NOTE — TELEPHONE ENCOUNTER
Called patient and informed Dr. Rincon sent Zepbound 5mg/week #2ml with 2 refills.   Anh Franklin RN

## 2025-06-17 DIAGNOSIS — I10 BENIGN ESSENTIAL HYPERTENSION: ICD-10-CM

## 2025-06-17 RX ORDER — OLMESARTAN MEDOXOMIL 20 MG/1
20 TABLET ORAL DAILY
Qty: 90 TABLET | Refills: 1 | Status: SHIPPED | OUTPATIENT
Start: 2025-06-17

## 2025-06-17 NOTE — TELEPHONE ENCOUNTER
Med: Olmesartan    LOV (related): 4/9/2025    Last Lab: basic metabolic panel on 7/31/2024    Due for F/U around: 7/9/2025 for MWM, 1/8/2026 for CPX    Next Appt: 8/13/2025        BP Readings from Last 3 Encounters:   04/09/25 127/55   01/08/25 135/72   11/14/24 118/76       Last Comprehensive Metabolic Panel:  Lab Results   Component Value Date     07/31/2024    POTASSIUM 4.5 07/31/2024    CHLORIDE 101 07/31/2024    CO2 24 07/31/2024    ANIONGAP 10 07/31/2024    GLC 90 07/31/2024    BUN 13.1 07/31/2024    CR 0.67 07/31/2024    GFRESTIMATED >90 07/31/2024    NOLAN 9.7 07/31/2024

## 2025-08-13 ENCOUNTER — OFFICE VISIT (OUTPATIENT)
Dept: FAMILY MEDICINE | Facility: CLINIC | Age: 40
End: 2025-08-13

## 2025-08-13 VITALS
HEART RATE: 77 BPM | SYSTOLIC BLOOD PRESSURE: 117 MMHG | BODY MASS INDEX: 27.73 KG/M2 | WEIGHT: 151.6 LBS | DIASTOLIC BLOOD PRESSURE: 58 MMHG | OXYGEN SATURATION: 99 %

## 2025-08-13 DIAGNOSIS — E66.3 OVERWEIGHT (BMI 25.0-29.9): ICD-10-CM

## 2025-08-13 DIAGNOSIS — Z86.39 H/O: OBESITY: ICD-10-CM

## 2025-08-13 DIAGNOSIS — I10 BENIGN ESSENTIAL HYPERTENSION: Primary | ICD-10-CM

## 2025-08-13 PROCEDURE — 0358T BIA WHOLE BODY: CPT | Performed by: FAMILY MEDICINE

## 2025-08-13 PROCEDURE — 3078F DIAST BP <80 MM HG: CPT | Performed by: FAMILY MEDICINE

## 2025-08-13 PROCEDURE — 3074F SYST BP LT 130 MM HG: CPT | Performed by: FAMILY MEDICINE

## 2025-08-13 PROCEDURE — 99214 OFFICE O/P EST MOD 30 MIN: CPT | Mod: 25 | Performed by: FAMILY MEDICINE

## 2025-08-20 DIAGNOSIS — E66.811 CLASS 1 OBESITY DUE TO EXCESS CALORIES WITH SERIOUS COMORBIDITY AND BODY MASS INDEX (BMI) OF 32.0 TO 32.9 IN ADULT: ICD-10-CM

## 2025-08-20 DIAGNOSIS — E66.09 CLASS 1 OBESITY DUE TO EXCESS CALORIES WITH SERIOUS COMORBIDITY AND BODY MASS INDEX (BMI) OF 32.0 TO 32.9 IN ADULT: ICD-10-CM

## 2025-08-20 RX ORDER — METFORMIN HYDROCHLORIDE 500 MG/1
TABLET, EXTENDED RELEASE ORAL
Qty: 270 TABLET | Refills: 3 | Status: SHIPPED | OUTPATIENT
Start: 2025-08-20